# Patient Record
Sex: FEMALE | Race: WHITE | NOT HISPANIC OR LATINO | Employment: PART TIME | URBAN - METROPOLITAN AREA
[De-identification: names, ages, dates, MRNs, and addresses within clinical notes are randomized per-mention and may not be internally consistent; named-entity substitution may affect disease eponyms.]

---

## 2017-02-08 DIAGNOSIS — Z01.812 PRE-OPERATIVE LABORATORY EXAMINATION: ICD-10-CM

## 2017-02-08 LAB — HCG SERPL QL: NEGATIVE

## 2017-02-08 PROCEDURE — 36415 COLL VENOUS BLD VENIPUNCTURE: CPT

## 2017-02-08 PROCEDURE — 84703 CHORIONIC GONADOTROPIN ASSAY: CPT

## 2017-02-08 RX ORDER — LITHIUM CARBONATE 300 MG/1
450 TABLET, FILM COATED, EXTENDED RELEASE ORAL NIGHTLY
COMMUNITY
End: 2023-11-17

## 2017-02-08 RX ORDER — BUPROPION HYDROCHLORIDE 100 MG/1
100 TABLET ORAL DAILY
COMMUNITY
End: 2022-01-20

## 2017-02-08 RX ORDER — DROSPIRENONE AND ETHINYL ESTRADIOL 0.02-3(28)
1 KIT ORAL DAILY
COMMUNITY
End: 2022-01-20

## 2017-02-08 RX ORDER — ACETAMINOPHEN 500 MG
500-1000 TABLET ORAL EVERY 6 HOURS PRN
COMMUNITY
End: 2022-01-20

## 2017-02-08 NOTE — OR NURSING
Pre admit appt: Pt instructed to continue regularly prescribed medications through day before surgery.Per anesthesia protocol pt instructed to take these medications with a sip of water the day of surgery- lithium and tylenol if needed.

## 2017-02-09 ENCOUNTER — HOSPITAL ENCOUNTER (OUTPATIENT)
Facility: MEDICAL CENTER | Age: 32
End: 2017-02-09
Attending: SPECIALIST | Admitting: SPECIALIST
Payer: COMMERCIAL

## 2017-02-09 VITALS
HEART RATE: 58 BPM | RESPIRATION RATE: 16 BRPM | BODY MASS INDEX: 27.69 KG/M2 | HEIGHT: 71 IN | TEMPERATURE: 97.7 F | SYSTOLIC BLOOD PRESSURE: 134 MMHG | DIASTOLIC BLOOD PRESSURE: 82 MMHG | WEIGHT: 197.75 LBS | OXYGEN SATURATION: 95 %

## 2017-02-09 PROBLEM — Z41.1 ENCOUNTER FOR COSMETIC SURGERY: Status: ACTIVE | Noted: 2017-02-09

## 2017-02-09 PROCEDURE — 700111 HCHG RX REV CODE 636 W/ 250 OVERRIDE (IP)

## 2017-02-09 PROCEDURE — 700111 HCHG RX REV CODE 636 W/ 250 OVERRIDE (IP): Performed by: SPECIALIST

## 2017-02-09 PROCEDURE — 160002 HCHG RECOVERY MINUTES (STAT): Performed by: SPECIALIST

## 2017-02-09 PROCEDURE — 160009 HCHG ANES TIME/MIN: Performed by: SPECIALIST

## 2017-02-09 PROCEDURE — 501480 HCHG STOCKINETTE: Performed by: SPECIALIST

## 2017-02-09 PROCEDURE — 160025 RECOVERY II MINUTES (STATS): Performed by: SPECIALIST

## 2017-02-09 PROCEDURE — 160046 HCHG PACU - 1ST 60 MINS PHASE II: Performed by: SPECIALIST

## 2017-02-09 PROCEDURE — 160035 HCHG PACU - 1ST 60 MINS PHASE I: Performed by: SPECIALIST

## 2017-02-09 PROCEDURE — 500888 HCHG PACK, MINOR BASIN: Performed by: SPECIALIST

## 2017-02-09 PROCEDURE — 160036 HCHG PACU - EA ADDL 30 MINS PHASE I: Performed by: SPECIALIST

## 2017-02-09 PROCEDURE — A9270 NON-COVERED ITEM OR SERVICE: HCPCS

## 2017-02-09 PROCEDURE — A6402 STERILE GAUZE <= 16 SQ IN: HCPCS | Performed by: SPECIALIST

## 2017-02-09 PROCEDURE — A4606 OXYGEN PROBE USED W OXIMETER: HCPCS | Performed by: SPECIALIST

## 2017-02-09 PROCEDURE — 110382 HCHG SHELL REV 271: Performed by: SPECIALIST

## 2017-02-09 PROCEDURE — 160029 HCHG SURGERY MINUTES - 1ST 30 MINS LEVEL 4: Performed by: SPECIALIST

## 2017-02-09 PROCEDURE — 110371 HCHG SHELL REV 272: Performed by: SPECIALIST

## 2017-02-09 PROCEDURE — 501656 HCHG TUBING, ASPIRATION (LIPO): Performed by: SPECIALIST

## 2017-02-09 PROCEDURE — 700102 HCHG RX REV CODE 250 W/ 637 OVERRIDE(OP)

## 2017-02-09 PROCEDURE — 700101 HCHG RX REV CODE 250

## 2017-02-09 PROCEDURE — 160041 HCHG SURGERY MINUTES - EA ADDL 1 MIN LEVEL 4: Performed by: SPECIALIST

## 2017-02-09 PROCEDURE — 160048 HCHG OR STATISTICAL LEVEL 1-5: Performed by: SPECIALIST

## 2017-02-09 PROCEDURE — 501838 HCHG SUTURE GENERAL: Performed by: SPECIALIST

## 2017-02-09 RX ORDER — OXYCODONE HCL 5 MG/5 ML
SOLUTION, ORAL ORAL
Status: COMPLETED
Start: 2017-02-09 | End: 2017-02-09

## 2017-02-09 RX ORDER — LIDOCAINE HYDROCHLORIDE 5 MG/ML
INJECTION, SOLUTION INFILTRATION; INTRAVENOUS
Status: DISCONTINUED | OUTPATIENT
Start: 2017-02-09 | End: 2017-02-09 | Stop reason: HOSPADM

## 2017-02-09 RX ORDER — EPINEPHRINE 1 MG/ML(1)
AMPUL (ML) INJECTION
Status: DISCONTINUED | OUTPATIENT
Start: 2017-02-09 | End: 2017-02-09 | Stop reason: HOSPADM

## 2017-02-09 RX ORDER — SODIUM CHLORIDE, SODIUM LACTATE, POTASSIUM CHLORIDE, CALCIUM CHLORIDE 600; 310; 30; 20 MG/100ML; MG/100ML; MG/100ML; MG/100ML
INJECTION, SOLUTION INTRAVENOUS
Status: DISCONTINUED | OUTPATIENT
Start: 2017-02-09 | End: 2017-02-09 | Stop reason: HOSPADM

## 2017-02-09 RX ADMIN — HYDROMORPHONE HYDROCHLORIDE 0.2 MG: 1 INJECTION, SOLUTION INTRAMUSCULAR; INTRAVENOUS; SUBCUTANEOUS at 14:05

## 2017-02-09 RX ADMIN — FENTANYL CITRATE 25 MCG: 50 INJECTION, SOLUTION INTRAMUSCULAR; INTRAVENOUS at 14:15

## 2017-02-09 RX ADMIN — FENTANYL CITRATE 25 MCG: 50 INJECTION, SOLUTION INTRAMUSCULAR; INTRAVENOUS at 14:05

## 2017-02-09 RX ADMIN — FENTANYL CITRATE 25 MCG: 50 INJECTION, SOLUTION INTRAMUSCULAR; INTRAVENOUS at 13:45

## 2017-02-09 RX ADMIN — SODIUM CHLORIDE, POTASSIUM CHLORIDE, SODIUM LACTATE AND CALCIUM CHLORIDE 1000 ML: 600; 310; 30; 20 INJECTION, SOLUTION INTRAVENOUS at 08:37

## 2017-02-09 RX ADMIN — HYDROMORPHONE HYDROCHLORIDE 0.2 MG: 1 INJECTION, SOLUTION INTRAMUSCULAR; INTRAVENOUS; SUBCUTANEOUS at 14:30

## 2017-02-09 RX ADMIN — FENTANYL CITRATE 25 MCG: 50 INJECTION, SOLUTION INTRAMUSCULAR; INTRAVENOUS at 13:50

## 2017-02-09 RX ADMIN — OXYCODONE HYDROCHLORIDE 10 MG: 5 SOLUTION ORAL at 14:00

## 2017-02-09 RX ADMIN — HYDROMORPHONE HYDROCHLORIDE 0.2 MG: 1 INJECTION, SOLUTION INTRAMUSCULAR; INTRAVENOUS; SUBCUTANEOUS at 14:15

## 2017-02-09 ASSESSMENT — PAIN SCALES - GENERAL
PAINLEVEL_OUTOF10: 8
PAINLEVEL_OUTOF10: 8
PAINLEVEL_OUTOF10: 6
PAINLEVEL_OUTOF10: 0
PAINLEVEL_OUTOF10: 0
PAINLEVEL_OUTOF10: 7
PAINLEVEL_OUTOF10: 5
PAINLEVEL_OUTOF10: 8
PAINLEVEL_OUTOF10: 8
PAINLEVEL_OUTOF10: 5

## 2017-02-09 NOTE — IP AVS SNAPSHOT
" Home Care Instructions                                                                                                                Name:Ninfa Valladares  Medical Record Number:[id number]  CSN: [id number]    YOB: 1985   Age: 31 y.o.  Sex: female  HT:1.803 m (5' 11\") WT: 89.7 kg (197 lb 12 oz)          Admit Date: 2/9/2017     Discharge Date:   Today's Date: 2/9/2017  Attending Doctor:  Jose Ramon Camarillo M.D.                  Allergies:  Review of patient's allergies indicates no known allergies.                Discharge Instructions         ACTIVITY: Rest and take it easy for the first 24 hours.  A responsible adult is recommended to remain with you during that time.  It is normal to feel sleepy.  We encourage you to not do anything that requires balance, judgment or coordination.    MILD FLU-LIKE SYMPTOMS ARE NORMAL. YOU MAY EXPERIENCE GENERALIZED MUSCLE ACHES, THROAT IRRITATION, HEADACHE AND/OR SOME NAUSEA.    FOR 24 HOURS DO NOT:  Drive, operate machinery or run household appliances.  Drink beer or alcoholic beverages.   Make important decisions or sign legal documents.    SPECIAL INSTRUCTIONS:     DIET: To avoid nausea, slowly advance diet as tolerated, avoiding spicy or greasy foods for the first day.  Add more substantial food to your diet according to your physician's instructions.  Babies can be fed formula or breast milk as soon as they are hungry.  INCREASE FLUIDS AND FIBER TO AVOID CONSTIPATION.    SURGICAL DRESSING/BATHING:may shower in 48 hours    FOLLOW-UP APPOINTMENT:  A follow-up appointment should be arranged with your doctor ; call to schedule.    You should CALL YOUR PHYSICIAN if you develop:  Fever greater than 101 degrees F.  Pain not relieved by medication, or persistent nausea or vomiting.  Excessive bleeding (blood soaking through dressing) or unexpected drainage from the wound.  Extreme redness or swelling around the incision site, drainage of pus or foul smelling " drainage.  Inability to urinate or empty your bladder within 8 hours.  Problems with breathing or chest pain.    You should call 911 if you develop problems with breathing or chest pain.  If you are unable to contact your doctor or surgical center, you should go to the nearest emergency room or urgent care center.  Physician's telephone #: [phone]    If any questions arise, call your doctor.  If your doctor is not available, please feel free to call the Surgical Center at {Surgical Dept Numbers:[id number]}.  The Center is open Monday through Friday from 7AM to 7PM.  You can also call the HEALTH HOTLINE open 24 hours/day, 7 days/week and speak to a nurse at [phone], or toll free at [phone].    A registered nurse may call you a few days after your surgery to see how you are doing after your procedure.    MEDICATIONS: Resume taking daily medication.  Take prescribed pain medication with food.  If no medication is prescribed, you may take non-aspirin pain medication if needed.  PAIN MEDICATION CAN BE VERY CONSTIPATING.  Take a stool softener or laxative such as senokot, pericolace, or milk of magnesia if needed.    Prescription given prior o surgery.  Last pain medication given at 2 pm.    If your physician has prescribed pain medication that includes Acetaminophen (Tylenol), do not take additional Acetaminophen (Tylenol) while taking the prescribed medication.    Depression / Suicide Risk    As you are discharged from this [facility] facility, it is important to learn how to keep safe from harming yourself.    Recognize the warning signs:  · Abrupt changes in personality, positive or negative- including increase in energy   · Giving away possessions  · Change in eating patterns- significant weight changes-  positive or negative  · Change in sleeping patterns- unable to sleep or sleeping all the time   · Unwillingness or inability to communicate  · Depression  · Unusual sadness, discouragement and  loneliness  · Talk of wanting to die  · Neglect of personal appearance   · Rebelliousness- reckless behavior  · Withdrawal from people/activities they love  · Confusion- inability to concentrate     If you or a loved one observes any of these behaviors or has concerns about self-harm, here's what you can do:  · Talk about it- your feelings and reasons for harming yourself  · Remove any means that you might use to hurt yourself (examples: pills, rope, extension cords, firearm)  · Get professional help from the community (Mental Health, Substance Abuse, psychological counseling)  · Do not be alone:Call your Safe Contact- someone whom you trust who will be there for you.  · Call your local CRISIS HOTLINE 785-9020 or 365-974-0641  · Call your local Children's Mobile Crisis Response Team [place] (545) 267-8376 or www.[employer]  · Call the toll free National Suicide Prevention Hotlines   · National Suicide Prevention Lifeline 767-970-OYXV (3935)  · [place] Line Network 800-SUICIDE (687-5988)       Medication List      CONTINUE taking these medications        Instructions    acetaminophen 500 MG Tabs   Commonly known as:  TYLENOL    Take 500-1,000 mg by mouth every 6 hours as needed.   Dose:  500-1000 mg       buPROPion 100 MG Tabs   Commonly known as:  WELLBUTRIN    Take 100 mg by mouth every day.   Dose:  100 mg       busPIRone 15 MG tablet   Commonly known as:  BUSPAR    Take 15 mg by mouth 2 times a day.   Dose:  15 mg       lithium  MG Tbcr   Commonly known as:  LITHOBID    Take 300 mg by mouth 2 times a day.   Dose:  300 mg       LORYNA 3-0.02 MG per tablet   Generic drug:  drospirenone-ethinyl estradiol    Take 1 Tab by mouth every day.   Dose:  1 Tab               Medication Information     Above and/or attached are the medications your physician expects you to take upon discharge. Review all of your home medications and newly ordered medications with your doctor and/or pharmacist. Follow  medication instructions as directed by your doctor and/or pharmacist. Please keep your medication list with you and share with your physician. Update the information when medications are discontinued, doses are changed, or new medications (including over-the-counter products) are added; and carry medication information at all times in the event of emergency situations.        Resources     Quit Smoking / Tobacco Use:    I understand the use of any tobacco products increases my chance of suffering from future heart disease or stroke and could cause other illnesses which may shorten my life. Quitting the use of tobacco products is the single most important thing I can do to improve my health. For further information on smoking / tobacco cessation call a Toll Free Quit Line at 1-338.574.8271 (*National [employer] [place]) or 1-780.761.4497 (American Lung Association) or you can access the web based program at www.lungusa.org.    [place] Tobacco Users Help Line:  (313) 609-5845       Toll Free: 1-941.124.7976  Quit Tobacco Program [facility] [phone])[phone]    Crisis Hotline:    [place] Crisis Hotline:  4-100-DKHOYOT or 1-845.943.9421    [place] Crisis Hotline:    1-833.756.1583 or 023-680-8898    Discharge Survey:   Thank you for choosing [facility]. We hope we did everything we could to make your hospital stay a pleasant one. You may be receiving a survey and we would appreciate your time and participation in answering the questions. Your input is very valuable to us in our efforts to improve our service to our patients and their families.            Signatures     My signature on this form indicates that:    1. I acknowledge receipt and understanding of these Home Care Instruction.  2. My questions regarding this information have been answered to my satisfaction.  3. I have formulated a plan with my discharge nurse to obtain my prescribed medications for  home.    __________________________________      __________________________________                   Patient Signature                                 Guardian/Responsible Adult Signature      __________________________________                 __________       ________                       Nurse Signature                                               Date                 Time

## 2017-02-09 NOTE — IP AVS SNAPSHOT
2/9/2017          Ninfa Valladares  [address] [place] [address]    Dear Ninfa:    [facility] wants to ensure your discharge home is safe and you or your loved ones have had all your questions answered regarding your care after you leave the hospital.    You may receive a telephone call within two days of your discharge.  This call is to make certain you understand your discharge instructions as well as ensure we provided you with the best care possible during your stay with us.     The call will only last approximately 3-5 minutes and will be done by a nurse.    Once again, we want to ensure your discharge home is safe and that you have a clear understanding of any next steps in your care.  If you have any questions or concerns, please do not hesitate to contact us, we are here for you.  Thank you for choosing [facility] for your healthcare needs.    Sincerely,    Luis Angel Vidal    [facility]

## 2017-02-09 NOTE — OR NURSING
Patient allergies and NPO status verified, home medication reconciliation completed and belongings secured. Patient verbalizes understanding of pain scale, expected course of stay and plan of care. Surgical site verified with patient. Patient and family given home care instructions sheet for discharge planning. IV access established. Sequentials placed on legs.

## 2017-02-09 NOTE — OR NURSING
1332-arrives pacu. vss spont clear resp throughout O2 via FM at 10L.  dsg to abd and flank dry andintact. Compression garment in place.  Pt unresponsive at present.  1345-pt spontaneously arouses OA d/cd tolerated well O2 placed at @L NC. VSS  1400-pt c/o 8/10 pain medicated per Md orders.  Warmer in place.  dsg dry and intact. Vss.  1430-pt sleeping intermittently, c/o continued pain to flanks and thighs. mdicated per MD orders.  vss  dsg dry and intact.  1500-pt vss admits pain tolerable at present.  Temp wnls.  No drainage noted at present.  Meets criteria for stage 2. Report to Kimi MCKEON

## 2017-02-09 NOTE — DISCHARGE INSTRUCTIONS
ACTIVITY: Rest and take it easy for the first 24 hours.  A responsible adult is recommended to remain with you during that time.  It is normal to feel sleepy.  We encourage you to not do anything that requires balance, judgment or coordination.    MILD FLU-LIKE SYMPTOMS ARE NORMAL. YOU MAY EXPERIENCE GENERALIZED MUSCLE ACHES, THROAT IRRITATION, HEADACHE AND/OR SOME NAUSEA.    FOR 24 HOURS DO NOT:  Drive, operate machinery or run household appliances.  Drink beer or alcoholic beverages.   Make important decisions or sign legal documents.    SPECIAL INSTRUCTIONS:     DIET: To avoid nausea, slowly advance diet as tolerated, avoiding spicy or greasy foods for the first day.  Add more substantial food to your diet according to your physician's instructions.  Babies can be fed formula or breast milk as soon as they are hungry.  INCREASE FLUIDS AND FIBER TO AVOID CONSTIPATION.    SURGICAL DRESSING/BATHING:may shower in 48 hours    FOLLOW-UP APPOINTMENT:  A follow-up appointment should be arranged with your doctor ; call to schedule.    You should CALL YOUR PHYSICIAN if you develop:  Fever greater than 101 degrees F.  Pain not relieved by medication, or persistent nausea or vomiting.  Excessive bleeding (blood soaking through dressing) or unexpected drainage from the wound.  Extreme redness or swelling around the incision site, drainage of pus or foul smelling drainage.  Inability to urinate or empty your bladder within 8 hours.  Problems with breathing or chest pain.    You should call 911 if you develop problems with breathing or chest pain.  If you are unable to contact your doctor or surgical center, you should go to the nearest emergency room or urgent care center.  Physician's telephone #: [phone]    If any questions arise, call your doctor.  If your doctor is not available, please feel free to call the Surgical Center at {Surgical Dept Numbers:[id number]}.  The Center is open Monday through Friday from 7AM to 7PM.  You can  also call the HEALTH HOTLINE open 24 hours/day, 7 days/week and speak to a nurse at [phone], or toll free at [phone].    A registered nurse may call you a few days after your surgery to see how you are doing after your procedure.    MEDICATIONS: Resume taking daily medication.  Take prescribed pain medication with food.  If no medication is prescribed, you may take non-aspirin pain medication if needed.  PAIN MEDICATION CAN BE VERY CONSTIPATING.  Take a stool softener or laxative such as senokot, pericolace, or milk of magnesia if needed.    Prescription given prior o surgery.  Last pain medication given at 2 pm.    If your physician has prescribed pain medication that includes Acetaminophen (Tylenol), do not take additional Acetaminophen (Tylenol) while taking the prescribed medication.    Depression / Suicide Risk    As you are discharged from this [facility] facility, it is important to learn how to keep safe from harming yourself.    Recognize the warning signs:  · Abrupt changes in personality, positive or negative- including increase in energy   · Giving away possessions  · Change in eating patterns- significant weight changes-  positive or negative  · Change in sleeping patterns- unable to sleep or sleeping all the time   · Unwillingness or inability to communicate  · Depression  · Unusual sadness, discouragement and loneliness  · Talk of wanting to die  · Neglect of personal appearance   · Rebelliousness- reckless behavior  · Withdrawal from people/activities they love  · Confusion- inability to concentrate     If you or a loved one observes any of these behaviors or has concerns about self-harm, here's what you can do:  · Talk about it- your feelings and reasons for harming yourself  · Remove any means that you might use to hurt yourself (examples: pills, rope, extension cords, firearm)  · Get professional help from the community (Mental Health, Substance Abuse, psychological  counseling)  · Do not be alone:Call your Safe Contact- someone whom you trust who will be there for you.  · Call your local CRISIS HOTLINE 695-0346 or 006-327-8998  · Call your local Children's Mobile Crisis Response Team [place] (920) 727-6195 or www.[employer]  · Call the toll free National Suicide Prevention Hotlines   · National Suicide Prevention Lifeline 209-041-LYKM (4451)  · National Hope Line Network 800-SUICIDE (851-3777)

## 2017-02-09 NOTE — OR NURSING
1515 Patient to stage 2 recovery. Up and dressed. Vital signs taken.   1528 Patients mom at chairside.  1535 Patient and mom given discharge instructions. Verbalizes understanding. No further questions or concerns. IV discontinued. Patient discharged via wheelchair.

## 2017-02-09 NOTE — OR SURGEON
Immediate Post-Operative Note      PreOp Diagnosis: lipodystrophy    PostOp Diagnosis: same    Procedure(s):  LIPOSUCTION - ABDOMEN, FLANKS, INNER AND OUTER THIGHS - Wound Class: Clean    Surgeon(s):  Jose Ramon Camarillo M.D.    Anesthesiologist/Type of Anesthesia:  Anesthesiologist: Deny Rodrigues M.D./General    Surgical Staff:  Circulator: Shelly Harris R.N.  Scrub Person: Mamie Lancaster    Specimen:     Estimated Blood Loss: 100cc    Findings:     Complications: none        2/9/2017 1:42 PM Jose Ramon Camarillo

## 2017-02-10 NOTE — OP REPORT
DATE OF SERVICE:  02/09/2017    PREOPERATIVE DIAGNOSES:  Lipodystrophy of abdomen, flanks, inner and outer   thighs.    POSTOPERATIVE DIAGNOSIS:  Lipodystrophy of abdomen, flanks, inner and outer   thighs.    OPERATIVE PROCEDURE:  Power-assisted liposuction of abdomen, flanks, inner and   outer thighs, 5000 mL of super wet technique instilled, 6000 mL of aspirate   removed.    SURGEON:  Jose Ramon Camarillo MD    ANESTHESIOLOGIST:  Dr. Rodrigues.    ANESTHESIA:  General endotracheal tube.    COMPLICATIONS:  None.    ESTIMATED BLOOD LOSS:  100 mL    INDICATIONS:  The patient is a 31-year-old female who is here for liposuction   of the above named areas.  Risks and benefits of the procedure have been   explained in full including infection, irregularities, asymmetries, rippling   the skin, dimpling of skin.  The patient is aware of the risks and wished to   proceed.    FINDINGS:  As above.    DESCRIPTION OF PROCEDURE:  The patient was preoperatively marked in the   holding room in standing position.  She was taken to operating theater where   general anesthesia was induced.  Ancef 2 g were given prior to starting the   procedure.  Starting with the tumescent or the superwet technique infusion.    We used 1 bag of lactated ringers, 1 amp of epinephrine and 50 mL of 0.5%   lidocaine plain.  This was instilled into the pre-drawn areas through stab   incisions with a 15 blade.  A total of 5000 mL was instilled.  Once we had   fill all the tumescent fluid, I used a 4 mm Mercedes tip lipo cannula on a   power-assisted device.  I removed approximately 1600 mL from the abdomen,   approximately 1100 mL from the flanks.  A 550 mL from the outer thighs and 400   mL from the inner thighs.  A small amount was also taken from suprapubic   area.  Throughout the case, I used the pinch technique to ensure even removal   of the subcutaneous tissue, crosshatched when I could for even removal of   subcutaneous tissue.  The patient tolerated  procedure quite well.  Once we   were done with the liposuction, I closed the incisions with 5-0 Prolene   interrupted sutures.  Once we are completely done, the patient  was placed in   a compression garment.  TopiFoam was placed on the abdomen and flanks.  She   was returned to the PACU, extubated in stable condition.       ____________________________________     MD BRIAN DERAS / SHONDA    DD:  02/09/2017 17:50:40  DT:  02/09/2017 20:13:06    D#:  [id number]  Job#:  [id number]

## 2020-07-01 ENCOUNTER — OFFICE VISIT (OUTPATIENT)
Dept: URGENT CARE | Facility: PHYSICIAN GROUP | Age: 35
End: 2020-07-01
Payer: COMMERCIAL

## 2020-07-01 VITALS
BODY MASS INDEX: 26.68 KG/M2 | HEIGHT: 71 IN | WEIGHT: 190.6 LBS | TEMPERATURE: 97.8 F | HEART RATE: 74 BPM | SYSTOLIC BLOOD PRESSURE: 116 MMHG | OXYGEN SATURATION: 95 % | DIASTOLIC BLOOD PRESSURE: 58 MMHG

## 2020-07-01 DIAGNOSIS — J30.2 SEASONAL ALLERGIES: ICD-10-CM

## 2020-07-01 PROCEDURE — 99202 OFFICE O/P NEW SF 15 MIN: CPT | Performed by: PHYSICIAN ASSISTANT

## 2020-07-01 RX ORDER — LAMOTRIGINE 200 MG/1
200 TABLET ORAL
COMMUNITY
Start: 2020-06-11 | End: 2022-01-20

## 2020-07-01 RX ORDER — NALTREXONE HYDROCHLORIDE 50 MG/1
50 TABLET, FILM COATED ORAL
COMMUNITY
Start: 2020-06-25 | End: 2022-01-20

## 2020-07-01 RX ORDER — FLUOXETINE 10 MG/1
CAPSULE ORAL
COMMUNITY
Start: 2020-06-19 | End: 2022-01-20

## 2020-07-01 ASSESSMENT — ENCOUNTER SYMPTOMS
DOUBLE VISION: 0
DIZZINESS: 0
TREMORS: 0
NAUSEA: 0
TINGLING: 0
MYALGIAS: 0
COUGH: 0
FEVER: 0
SINUS PAIN: 0
CHILLS: 0
HEADACHES: 0
BLURRED VISION: 0
DIAPHORESIS: 0
ABDOMINAL PAIN: 0
VOMITING: 0
WEIGHT LOSS: 1
WHEEZING: 0
PALPITATIONS: 0
SHORTNESS OF BREATH: 0
SORE THROAT: 1

## 2020-07-01 NOTE — PROGRESS NOTES
Subjective:   Ninfa Simpson is a 34 y.o. female who presents for Pharyngitis (Sore throat over the last couple of months.  Most recently having bloody noses, ear pain and has lost 5 pounds over the last week.)    HPI:  This is a very pleasant 34-year-old female presents the clinic with sore throat which is been intermittent for the last 2 months.  Patient is concerned that she may have throat cancer.  Over the last month the patient describes her throat pain worse in the morning which gradually subsides throughout the day.  She also describes small amounts of blood after brushing her teeth.  Associated symptoms include runny nose sinus congestion and ear fullness.  Patient denies any fever, chills, cough, shortness of breath, difficulty swallowing or headaches.  She has not tried anything for symptoms as of yet.  Patient is a smoker and is currently trying to quit.    Review of Systems   Constitutional: Positive for weight loss (5 lb weigh loss in the last month.). Negative for chills, diaphoresis, fever and malaise/fatigue.   HENT: Positive for congestion and sore throat. Negative for ear discharge, hearing loss, sinus pain and tinnitus.    Eyes: Negative for blurred vision and double vision.   Respiratory: Negative for cough, shortness of breath and wheezing.    Cardiovascular: Negative for chest pain and palpitations.   Gastrointestinal: Negative for abdominal pain, nausea and vomiting.   Musculoskeletal: Negative for myalgias.   Skin: Negative for rash.   Neurological: Negative for dizziness, tingling, tremors and headaches.       Medications:    • acetaminophen Tabs  • buPROPion Tabs  • busPIRone  • drospirenone-ethinyl estradiol  • FLUoxetine Caps  • lamotrigine  • lithium CR Tbcr  • naltrexone Tabs    Allergies: Cephalexin and Clindamycin    Problem List: Ninfa Simpson has Encounter for cosmetic surgery on their problem list.    Surgical History:  Past Surgical History:   Procedure Laterality Date  "  • LIPOSUCTION Bilateral 2/9/2017    Procedure: LIPOSUCTION - ABDOMEN, FLANKS, INNER AND OUTER THIGHS;  Surgeon: Jose Ramon Camarillo M.D.;  Location: SURGERY [facility];  Service:    • TONSILLECTOMY  2016   • CYST EXCISION  2016    on leg   • LESION EXCISION GENERAL  2002    BIRTHMARK REMOVED   • MYRINGOTOMY Bilateral 1992       Past Social Hx: Ninfa Simpson  reports that she quit smoking about 4 years ago. Her smoking use included cigarettes. She quit after 10.00 years of use. She uses smokeless tobacco. She reports current alcohol use. She reports that she does not use drugs.     Past Family Hx:  Ninfa Simpson family history is not on file.     Problem list, medications, and allergies reviewed by myself today in Epic.     Objective:     /58   Pulse 74   Temp 36.6 °C (97.8 °F) (Temporal)   Ht 1.803 m (5' 11\")   Wt 86.5 kg (190 lb 9.6 oz)   LMP 06/10/2020 (Approximate)   SpO2 95%   BMI 26.58 kg/m²     Physical Exam  Constitutional:       General: She is not in acute distress.     Appearance: Normal appearance. She is not ill-appearing, toxic-appearing or diaphoretic.   HENT:      Head: Normocephalic and atraumatic.      Right Ear: Tympanic membrane, ear canal and external ear normal.      Left Ear: Tympanic membrane, ear canal and external ear normal.      Ears:      Comments: Small amounts of fluid behind the bilateral TMs.  Nonpurulent.  TMs nonerythematous and nonbulging.     Nose: Congestion present. No rhinorrhea.      Comments: Pale boggy pale mucosa.     Mouth/Throat:      Mouth: Mucous membranes are moist.      Pharynx: No oropharyngeal exudate or posterior oropharyngeal erythema.      Comments: Posterior erythemic's revealed postnasal drip.  Mildly erythematous no exudates.  Uvula midline and nondeviated.  Eyes:      Conjunctiva/sclera: Conjunctivae normal.   Neck:      Musculoskeletal: Normal range of motion and neck supple. No muscular tenderness.   Cardiovascular:      Rate and " Rhythm: Normal rate and regular rhythm.      Pulses: Normal pulses.      Heart sounds: Normal heart sounds.   Pulmonary:      Effort: Pulmonary effort is normal.      Breath sounds: Normal breath sounds. No wheezing.   Lymphadenopathy:      Cervical: No cervical adenopathy.   Skin:     General: Skin is warm and dry.      Capillary Refill: Capillary refill takes less than 2 seconds.   Neurological:      General: No focal deficit present.      Mental Status: She is alert and oriented to person, place, and time. Mental status is at baseline.   Psychiatric:         Mood and Affect: Mood normal.         Thought Content: Thought content normal.           Assessment/Plan:     Diagnosis and associated orders:     1. Seasonal allergies        Comments/MDM:       • Recommend starting OTC Flonase 2 sprays per nostril once per day.  • Start OTC Claritin/Zyrtec/Allegra 1/day.  • Follow-up with primary care tomorrow discuss possibility of starting Chantix.  • Recommended blood work including TSH to be performed by primary care.  • Red flag symptoms discussed with the patient at length today.  If any of the symptoms present return to clinic or nearest emergency department.           Differential diagnosis, natural history, supportive care, and indications for immediate follow-up discussed.    Advised the patient to follow-up with the primary care physician for recheck, reevaluation, and consideration of further management.    Please note that this dictation was created using voice recognition software. I have made reasonable attempt to correct obvious errors, but I expect that there are errors of grammar and possibly content that I did not discover before finalizing the note.    This note was electronically signed by ANGELA Rivera PA-C

## 2020-07-30 ENCOUNTER — APPOINTMENT (RX ONLY)
Dept: URBAN - METROPOLITAN AREA CLINIC 22 | Facility: CLINIC | Age: 35
Setting detail: DERMATOLOGY
End: 2020-07-30

## 2020-07-30 DIAGNOSIS — L81.4 OTHER MELANIN HYPERPIGMENTATION: ICD-10-CM

## 2020-07-30 DIAGNOSIS — D22 MELANOCYTIC NEVI: ICD-10-CM

## 2020-07-30 DIAGNOSIS — L91.0 HYPERTROPHIC SCAR: ICD-10-CM

## 2020-07-30 DIAGNOSIS — D18.0 HEMANGIOMA: ICD-10-CM

## 2020-07-30 DIAGNOSIS — Z71.89 OTHER SPECIFIED COUNSELING: ICD-10-CM

## 2020-07-30 PROBLEM — D22.5 MELANOCYTIC NEVI OF TRUNK: Status: ACTIVE | Noted: 2020-07-30

## 2020-07-30 PROBLEM — D18.01 HEMANGIOMA OF SKIN AND SUBCUTANEOUS TISSUE: Status: ACTIVE | Noted: 2020-07-30

## 2020-07-30 PROBLEM — D48.5 NEOPLASM OF UNCERTAIN BEHAVIOR OF SKIN: Status: ACTIVE | Noted: 2020-07-30

## 2020-07-30 PROCEDURE — ? COUNSELING

## 2020-07-30 PROCEDURE — 11102 TANGNTL BX SKIN SINGLE LES: CPT | Mod: 59

## 2020-07-30 PROCEDURE — ? PHOTO-DOCUMENTATION

## 2020-07-30 PROCEDURE — 99203 OFFICE O/P NEW LOW 30 MIN: CPT | Mod: 25

## 2020-07-30 PROCEDURE — 11103 TANGNTL BX SKIN EA SEP/ADDL: CPT | Mod: 59

## 2020-07-30 PROCEDURE — ? BIOPSY BY SHAVE METHOD

## 2020-07-30 PROCEDURE — 56605 BIOPSY OF VULVA/PERINEUM: CPT

## 2020-07-30 ASSESSMENT — LOCATION DETAILED DESCRIPTION DERM
LOCATION DETAILED: MONS PUBIS
LOCATION DETAILED: RIGHT DISTAL POSTERIOR THIGH
LOCATION DETAILED: LEFT INFERIOR UPPER BACK
LOCATION DETAILED: LEFT SUPERIOR UPPER BACK
LOCATION DETAILED: LEFT INFERIOR POSTERIOR HELIX
LOCATION DETAILED: LEFT PROXIMAL DORSAL FOREARM
LOCATION DETAILED: LEFT LATERAL ABDOMEN
LOCATION DETAILED: RIGHT MID-UPPER BACK

## 2020-07-30 ASSESSMENT — LOCATION SIMPLE DESCRIPTION DERM
LOCATION SIMPLE: RIGHT POSTERIOR THIGH
LOCATION SIMPLE: LEFT FOREARM
LOCATION SIMPLE: GROIN
LOCATION SIMPLE: ABDOMEN
LOCATION SIMPLE: RIGHT UPPER BACK
LOCATION SIMPLE: LEFT UPPER BACK
LOCATION SIMPLE: LEFT EAR

## 2020-07-30 ASSESSMENT — LOCATION ZONE DERM
LOCATION ZONE: TRUNK
LOCATION ZONE: VULVA
LOCATION ZONE: EAR
LOCATION ZONE: LEG
LOCATION ZONE: ARM

## 2021-07-16 ENCOUNTER — OFFICE VISIT (OUTPATIENT)
Dept: URGENT CARE | Facility: PHYSICIAN GROUP | Age: 36
End: 2021-07-16
Payer: COMMERCIAL

## 2021-07-16 VITALS
OXYGEN SATURATION: 99 % | BODY MASS INDEX: 28.7 KG/M2 | HEIGHT: 71 IN | RESPIRATION RATE: 16 BRPM | TEMPERATURE: 98.2 F | SYSTOLIC BLOOD PRESSURE: 136 MMHG | DIASTOLIC BLOOD PRESSURE: 76 MMHG | WEIGHT: 205 LBS | HEART RATE: 56 BPM

## 2021-07-16 DIAGNOSIS — R19.7 DIARRHEA OF PRESUMED INFECTIOUS ORIGIN: ICD-10-CM

## 2021-07-16 PROCEDURE — 99214 OFFICE O/P EST MOD 30 MIN: CPT | Performed by: NURSE PRACTITIONER

## 2021-07-16 RX ORDER — AZITHROMYCIN 500 MG/1
500 TABLET, FILM COATED ORAL DAILY
Qty: 5 TABLET | Refills: 0 | Status: SHIPPED | OUTPATIENT
Start: 2021-07-16 | End: 2021-07-21

## 2021-07-16 NOTE — PATIENT INSTRUCTIONS
Diarrhea, Adult  Diarrhea is frequent loose and watery bowel movements. Diarrhea can make you feel weak and cause you to become dehydrated. Dehydration can make you tired and thirsty, cause you to have a dry mouth, and decrease how often you urinate.  Diarrhea typically lasts 2-3 days. However, it can last longer if it is a sign of something more serious. It is important to treat your diarrhea as told by your health care provider.  Follow these instructions at home:  Eating and drinking         Follow these recommendations as told by your health care provider:  · Take an oral rehydration solution (ORS). This is an over-the-counter medicine that helps return your body to its normal balance of nutrients and water. It is found at pharmacies and retail stores.  · Drink plenty of fluids, such as water, ice chips, diluted fruit juice, and low-calorie sports drinks. You can drink milk also, if desired.  · Avoid drinking fluids that contain a lot of sugar or caffeine, such as energy drinks, sports drinks, and soda.  · Eat bland, easy-to-digest foods in small amounts as you are able. These foods include bananas, applesauce, rice, lean meats, toast, and crackers.  · Avoid alcohol.  · Avoid spicy or fatty foods.    Medicines  · Take over-the-counter and prescription medicines only as told by your health care provider.  · If you were prescribed an antibiotic medicine, take it as told by your health care provider. Do not stop using the antibiotic even if you start to feel better.  General instructions    · Wash your hands often using soap and water. If soap and water are not available, use a hand . Others in the household should wash their hands as well. Hands should be washed:  ? After using the toilet or changing a diaper.  ? Before preparing, cooking, or serving food.  ? While caring for a sick person or while visiting someone in a hospital.  · Drink enough fluid to keep your urine pale yellow.  · Rest at home while  you recover.  · Watch your condition for any changes.  · Take a warm bath to relieve any burning or pain from frequent diarrhea episodes.  · Keep all follow-up visits as told by your health care provider. This is important.  Contact a health care provider if:  · You have a fever.  · Your diarrhea gets worse.  · You have new symptoms.  · You cannot keep fluids down.  · You feel light-headed or dizzy.  · You have a headache.  · You have muscle cramps.  Get help right away if:  · You have chest pain.  · You feel extremely weak or you faint.  · You have bloody or black stools or stools that look like tar.  · You have severe pain, cramping, or bloating in your abdomen.  · You have trouble breathing or you are breathing very quickly.  · Your heart is beating very quickly.  · Your skin feels cold and clammy.  · You feel confused.  · You have signs of dehydration, such as:  ? Dark urine, very little urine, or no urine.  ? Cracked lips.  ? Dry mouth.  ? Sunken eyes.  ? Sleepiness.  ? Weakness.  Summary  · Diarrhea is frequent loose and watery bowel movements. Diarrhea can make you feel weak and cause you to become dehydrated.  · Drink enough fluids to keep your urine pale yellow.  · Make sure that you wash your hands after using the toilet. If soap and water are not available, use hand .  · Contact a health care provider if your diarrhea gets worse or you have new symptoms.  · Get help right away if you have signs of dehydration.  This information is not intended to replace advice given to you by your health care provider. Make sure you discuss any questions you have with your health care provider.  Document Released: 12/08/2003 Document Revised: 05/24/2019 Document Reviewed: 05/24/2019  [employer] Patient Education © 2020 [employer]

## 2021-07-16 NOTE — PROGRESS NOTES
Subjective:     Ninfa Simpson is a 35 y.o. female who presents for GI Problem (x3 weeks, pt states stomach pain, diarrhea, mucus)       GI Problem  This is a new problem. The problem has been gradually worsening. Associated symptoms include abdominal pain. Pertinent negatives include no chills, fever, nausea or vomiting.     Patient reports that 3 weeks ago, she started to develop diarrhea as well as lower abdominal cramping and pain.  Reports that her stools seemed to have mucus in it.  Was seen at [place] 1 week ago.  Started on a course of antibiotics for 5 days.  Patient does not recall which type.  However, reports that symptoms did improve.  However, started to recur once she finished the antibiotic.  Denies recent foreign travel.  Denies fever, shortness of breath, chills, or other symptoms.    Patient was screened prior to rooming and denied COVID-19 diagnosis or contact with a person who has been diagnosed or is suspected to have COVID-19. During this visit, appropriate PPE was worn, hand hygiene was performed, and the patient and any visitors were masked.     PMH:  has a past medical history of Psychiatric problem.    MEDS:   Current Outpatient Medications:   •  azithromycin (ZITHROMAX) 500 MG tablet, Take 1 tablet by mouth every day for 5 days., Disp: 5 tablet, Rfl: 0  •  naltrexone (DEPADE) 50 MG Tab, Take 50 mg by mouth every day., Disp: , Rfl:   •  lamotrigine (LAMICTAL) 200 MG tablet, Take 200 mg by mouth every day., Disp: , Rfl:   •  FLUoxetine (PROZAC) 10 MG Cap, TAKE 1 CAPSULE DAILY FOR 14 DAYS 10 DAYS PRIOR TO MENSTRUAL CYCLE., Disp: , Rfl:   •  buPROPion (WELLBUTRIN) 100 MG Tab, Take 100 mg by mouth every day., Disp: , Rfl:   •  lithium CR (LITHOBID) 300 MG Tab CR, Take 300 mg by mouth 2 times a day., Disp: , Rfl:   •  drospirenone-ethinyl estradiol (LORYNA) 3-0.02 MG per tablet, Take 1 Tab by mouth every day., Disp: , Rfl:   •  acetaminophen (TYLENOL) 500 MG Tab, Take 500-1,000 mg by  "mouth every 6 hours as needed., Disp: , Rfl:   •  busPIRone (BUSPAR) 15 MG tablet, Take 15 mg by mouth 2 times a day., Disp: , Rfl:     ALLERGIES:   Allergies   Allergen Reactions   • Cephalexin    • Clindamycin      SURGHX:   Past Surgical History:   Procedure Laterality Date   • LIPOSUCTION Bilateral 2/9/2017    Procedure: LIPOSUCTION - ABDOMEN, FLANKS, INNER AND OUTER THIGHS;  Surgeon: Jose Ramon Camarillo M.D.;  Location: SURGERY [facility];  Service:    • TONSILLECTOMY  2016   • CYST EXCISION  2016    on leg   • LESION EXCISION GENERAL  2002    BIRTHMARK REMOVED   • MYRINGOTOMY Bilateral 1992     SOCHX:  reports that she quit smoking about 5 years ago. Her smoking use included cigarettes. She quit after 10.00 years of use. She has quit using smokeless tobacco. She reports previous alcohol use. She reports that she does not use drugs.     FH: Reviewed with patient, not pertinent to this visit.    Review of Systems   Constitutional: Negative.  Negative for chills, fever and malaise/fatigue.   Respiratory: Negative.  Negative for shortness of breath.    Gastrointestinal: Positive for abdominal pain and diarrhea. Negative for blood in stool, nausea and vomiting.   All other systems reviewed and are negative.    Additional details per HPI.      Objective:     /76   Pulse (!) 56   Temp 36.8 °C (98.2 °F) (Temporal)   Resp 16   Ht 1.803 m (5' 11\")   Wt 93 kg (205 lb)   SpO2 99%   BMI 28.59 kg/m²     Physical Exam  Vitals reviewed.   Constitutional:       General: She is not in acute distress.     Appearance: She is well-developed. She is not ill-appearing or toxic-appearing.   HENT:      Head: Normocephalic.      Right Ear: External ear normal.      Left Ear: External ear normal.   Eyes:      General: Vision grossly intact.      Extraocular Movements: Extraocular movements intact.      Conjunctiva/sclera: Conjunctivae normal.   Cardiovascular:      Rate and Rhythm: Normal rate.   Pulmonary:      Effort: " Pulmonary effort is normal. No respiratory distress.   Abdominal:      General: Bowel sounds are increased. There is no distension.      Palpations: Abdomen is soft.      Tenderness: There is abdominal tenderness in the right lower quadrant and left lower quadrant. There is no guarding or rebound. Negative signs include McBurney's sign.   Musculoskeletal:         General: No deformity. Normal range of motion.      Cervical back: Normal range of motion.   Skin:     General: Skin is warm and dry.      Coloration: Skin is not pale.   Neurological:      Mental Status: She is alert and oriented to person, place, and time.      Sensory: No sensory deficit.      Motor: No weakness.      Coordination: Coordination normal.   Psychiatric:         Behavior: Behavior normal. Behavior is cooperative.       Assessment/Plan:     1. Diarrhea of presumed infectious origin  - CULTURE STOOL; Future  - SALMONELLA/SHIGELLA SCREEN  - CRYPTO/GIARDIA RAPID ASSAY; Future  - Complete O&P; Future  - C Diff Toxin; Future  - azithromycin (ZITHROMAX) 500 MG tablet; Take 1 tablet by mouth every day for 5 days.  Dispense: 5 tablet; Refill: 0    Rx as above sent electronically. Will obtain stool studies.    Differential diagnosis, natural history, supportive care, over-the-counter symptom management per 's instructions, close monitoring, and indications for immediate follow-up discussed.     Patient advised to: Return for 1) Symptoms that worsen/don't improve, or go to ER, 2) Follow up with primary care in 7-10 days.    All questions answered. Patient agrees with the plan of care.    Discharge summary provided.

## 2021-07-18 ENCOUNTER — TELEPHONE (OUTPATIENT)
Dept: URGENT CARE | Facility: PHYSICIAN GROUP | Age: 36
End: 2021-07-18

## 2021-07-20 ASSESSMENT — ENCOUNTER SYMPTOMS
BLOOD IN STOOL: 0
FEVER: 0
CHILLS: 0
RESPIRATORY NEGATIVE: 1
ABDOMINAL PAIN: 1
DIARRHEA: 1
NAUSEA: 0
CONSTITUTIONAL NEGATIVE: 1
VOMITING: 0
ROS GI COMMENTS: 1
SHORTNESS OF BREATH: 0

## 2021-07-20 ASSESSMENT — VISUAL ACUITY: OU: 1

## 2021-08-06 ENCOUNTER — APPOINTMENT (RX ONLY)
Dept: URBAN - METROPOLITAN AREA CLINIC 22 | Facility: CLINIC | Age: 36
Setting detail: DERMATOLOGY
End: 2021-08-06

## 2021-08-06 DIAGNOSIS — D18.0 HEMANGIOMA: ICD-10-CM

## 2021-08-06 DIAGNOSIS — L81.4 OTHER MELANIN HYPERPIGMENTATION: ICD-10-CM

## 2021-08-06 DIAGNOSIS — D22 MELANOCYTIC NEVI: ICD-10-CM

## 2021-08-06 DIAGNOSIS — Z87.2 PERSONAL HISTORY OF DISEASES OF THE SKIN AND SUBCUTANEOUS TISSUE: ICD-10-CM

## 2021-08-06 DIAGNOSIS — Z71.89 OTHER SPECIFIED COUNSELING: ICD-10-CM

## 2021-08-06 PROBLEM — D48.5 NEOPLASM OF UNCERTAIN BEHAVIOR OF SKIN: Status: ACTIVE | Noted: 2021-08-06

## 2021-08-06 PROBLEM — D22.5 MELANOCYTIC NEVI OF TRUNK: Status: ACTIVE | Noted: 2021-08-06

## 2021-08-06 PROBLEM — D18.01 HEMANGIOMA OF SKIN AND SUBCUTANEOUS TISSUE: Status: ACTIVE | Noted: 2021-08-06

## 2021-08-06 PROCEDURE — ? COUNSELING

## 2021-08-06 PROCEDURE — ? BIOPSY BY SHAVE METHOD

## 2021-08-06 PROCEDURE — 11102 TANGNTL BX SKIN SINGLE LES: CPT

## 2021-08-06 PROCEDURE — 11103 TANGNTL BX SKIN EA SEP/ADDL: CPT

## 2021-08-06 PROCEDURE — ? SUNSCREEN RECOMMENDATIONS

## 2021-08-06 PROCEDURE — 99213 OFFICE O/P EST LOW 20 MIN: CPT | Mod: 25

## 2021-08-06 ASSESSMENT — LOCATION SIMPLE DESCRIPTION DERM
LOCATION SIMPLE: LEFT UPPER BACK
LOCATION SIMPLE: RIGHT PRETIBIAL REGION
LOCATION SIMPLE: ABDOMEN
LOCATION SIMPLE: LEFT POSTERIOR AXILLA
LOCATION SIMPLE: LEFT FOREARM
LOCATION SIMPLE: RIGHT POSTERIOR THIGH

## 2021-08-06 ASSESSMENT — LOCATION DETAILED DESCRIPTION DERM
LOCATION DETAILED: RIGHT DISTAL POSTERIOR THIGH
LOCATION DETAILED: RIGHT PROXIMAL PRETIBIAL REGION
LOCATION DETAILED: LEFT INFERIOR UPPER BACK
LOCATION DETAILED: LEFT LATERAL ABDOMEN
LOCATION DETAILED: LEFT PROXIMAL DORSAL FOREARM
LOCATION DETAILED: LEFT POSTERIOR AXILLA

## 2021-08-06 ASSESSMENT — LOCATION ZONE DERM
LOCATION ZONE: TRUNK
LOCATION ZONE: LEG
LOCATION ZONE: ARM
LOCATION ZONE: AXILLAE

## 2021-08-10 ENCOUNTER — OFFICE VISIT (OUTPATIENT)
Dept: URGENT CARE | Facility: PHYSICIAN GROUP | Age: 36
End: 2021-08-10
Payer: COMMERCIAL

## 2021-08-10 VITALS
BODY MASS INDEX: 28.7 KG/M2 | HEART RATE: 60 BPM | OXYGEN SATURATION: 100 % | TEMPERATURE: 97.6 F | RESPIRATION RATE: 16 BRPM | SYSTOLIC BLOOD PRESSURE: 114 MMHG | HEIGHT: 71 IN | DIASTOLIC BLOOD PRESSURE: 54 MMHG | WEIGHT: 205 LBS

## 2021-08-10 DIAGNOSIS — B37.0 THRUSH: ICD-10-CM

## 2021-08-10 PROCEDURE — 99213 OFFICE O/P EST LOW 20 MIN: CPT | Performed by: NURSE PRACTITIONER

## 2021-08-10 ASSESSMENT — ENCOUNTER SYMPTOMS
CHILLS: 0
FEVER: 0
COUGH: 0
DIARRHEA: 1
MYALGIAS: 0
HEADACHES: 0
SORE THROAT: 0

## 2021-08-11 NOTE — PROGRESS NOTES
Subjective:     Ninfa Simpson is a 36 y.o. female who presents for Thrush (x2 weeks ago )      HPI  Pt presents for evaluation of a new problem.  Ninfa is a pleasant 36-year-old female presents to urgent care today with complaints of what she believes to be thrush.  For the past 3 months she has been suffering from chronic diarrhea and has been placed on 2 separate antibiotics to treat this.  She notes that after her second antibiotic she developed a white coating in her mouth.  She denies any pain or itching associated with this.  She has not used anything for the relief of her symptoms.     Review of Systems   Constitutional: Negative for chills and fever.   HENT: Negative for congestion and sore throat.    Respiratory: Negative for cough.    Gastrointestinal: Positive for diarrhea.   Musculoskeletal: Negative for myalgias.   Neurological: Negative for headaches.       PMH:   Past Medical History:   Diagnosis Date   • Psychiatric problem     depression,anxiety and borderline personality disorder     ALLERGIES:   Allergies   Allergen Reactions   • Cephalexin    • Clindamycin      SURGHX:   Past Surgical History:   Procedure Laterality Date   • LIPOSUCTION Bilateral 2017    Procedure: LIPOSUCTION - ABDOMEN, FLANKS, INNER AND OUTER THIGHS;  Surgeon: Jose Ramon Camarillo M.D.;  Location: SURGERY [facility];  Service:    • TONSILLECTOMY  2016   • CYST EXCISION  2016    on leg   • LESION EXCISION GENERAL      BIRTHMARK REMOVED   • MYRINGOTOMY Bilateral      SOCHX:   Social History     Socioeconomic History   • Marital status: Single     Spouse name: Not on file   • Number of children: Not on file   • Years of education: Not on file   • Highest education level: Not on file   Occupational History   • Not on file   Tobacco Use   • Smoking status: Former Smoker     Years: 10.00     Types: Cigarettes     Quit date: 2016     Years since quittin.5   • Smokeless tobacco: Former User   Vaping Use   •  "Vaping Use: Never used   Substance and Sexual Activity   • Alcohol use: Not Currently     Comment: quit 2 years ago   • Drug use: No   • Sexual activity: Not on file   Other Topics Concern   • Not on file   Social History Narrative   • Not on file     Social Determinants of Health     Financial Resource Strain:    • Difficulty of Paying Living Expenses:    Food Insecurity:    • Worried About Running Out of Food in the Last Year:    • Ran Out of Food in the Last Year:    Transportation Needs:    • Lack of Transportation (Medical):    • Lack of Transportation (Non-Medical):    Physical Activity:    • Days of Exercise per Week:    • Minutes of Exercise per Session:    Stress:    • Feeling of Stress :    Social Connections:    • Frequency of Communication with Friends and Family:    • Frequency of Social Gatherings with Friends and Family:    • Attends Anabaptism Services:    • Active Member of Clubs or Organizations:    • Attends Club or Organization Meetings:    • Marital Status:    Intimate Partner Violence:    • Fear of Current or Ex-Partner:    • Emotionally Abused:    • Physically Abused:    • Sexually Abused:      FH: History reviewed. No pertinent family history.      Objective:   /54   Pulse 60   Temp 36.4 °C (97.6 °F) (Temporal)   Resp 16   Ht 1.803 m (5' 11\")   Wt 93 kg (205 lb)   SpO2 100%   BMI 28.59 kg/m²     Physical Exam  Vitals and nursing note reviewed.   Constitutional:       General: She is not in acute distress.     Appearance: Normal appearance. She is not ill-appearing.   HENT:      Head: Normocephalic and atraumatic.      Right Ear: External ear normal.      Left Ear: External ear normal.      Nose: No congestion or rhinorrhea.      Mouth/Throat:      Mouth: Mucous membranes are moist.      Comments: Positive for white coating on tongue consistent with a possible thrush infection.  Eyes:      Extraocular Movements: Extraocular movements intact.      Pupils: Pupils are equal, round, " and reactive to light.   Cardiovascular:      Rate and Rhythm: Normal rate and regular rhythm.      Pulses: Normal pulses.      Heart sounds: Normal heart sounds.   Pulmonary:      Effort: Pulmonary effort is normal.      Breath sounds: Normal breath sounds.   Abdominal:      General: Abdomen is flat. Bowel sounds are normal.      Palpations: Abdomen is soft.   Musculoskeletal:         General: Normal range of motion.      Cervical back: Normal range of motion and neck supple.   Skin:     General: Skin is warm and dry.      Capillary Refill: Capillary refill takes less than 2 seconds.   Neurological:      General: No focal deficit present.      Mental Status: She is alert and oriented to person, place, and time. Mental status is at baseline.   Psychiatric:         Mood and Affect: Mood normal.         Behavior: Behavior normal.         Thought Content: Thought content normal.         Judgment: Judgment normal.         Assessment/Plan:   Assessment      1. Thrush  nystatin (MYCOSTATIN) 652184 UNIT/ML Suspension   She was encouraged to complete her stool testing and follow-up with GI consultants for further evaluation and treatment of her diarrhea.  Did prescribe nystatin suspension for relief of likely thrush infection.  Follow-up in clinic for worsening or persistent symptoms.  She verbalizes agreement and understanding of plan of care.

## 2021-08-12 ENCOUNTER — HOSPITAL ENCOUNTER (OUTPATIENT)
Facility: MEDICAL CENTER | Age: 36
End: 2021-08-12
Attending: NURSE PRACTITIONER
Payer: COMMERCIAL

## 2021-08-12 DIAGNOSIS — R19.7 DIARRHEA OF PRESUMED INFECTIOUS ORIGIN: ICD-10-CM

## 2021-08-12 LAB
C DIFF DNA SPEC QL NAA+PROBE: NEGATIVE
C DIFF TOX GENS STL QL NAA+PROBE: NEGATIVE
G LAMBLIA+C PARVUM AG STL QL RAPID: NORMAL
SIGNIFICANT IND 70042: NORMAL
SITE SITE: NORMAL
SOURCE SOURCE: NORMAL

## 2021-08-12 PROCEDURE — 87329 GIARDIA AG IA: CPT

## 2021-08-12 PROCEDURE — 87493 C DIFF AMPLIFIED PROBE: CPT

## 2021-08-12 PROCEDURE — 87045 FECES CULTURE AEROBIC BACT: CPT

## 2021-08-12 PROCEDURE — 87328 CRYPTOSPORIDIUM AG IA: CPT

## 2021-08-12 PROCEDURE — 87899 AGENT NOS ASSAY W/OPTIC: CPT

## 2021-08-14 LAB
BACTERIA STL CULT: NORMAL
C JEJUNI+C COLI AG STL QL: NORMAL
E COLI SXT1+2 STL IA: NORMAL
E COLI SXT1+2 STL IA: NORMAL
SIGNIFICANT IND 70042: NORMAL
SIGNIFICANT IND 70042: NORMAL
SITE SITE: NORMAL
SITE SITE: NORMAL
SOURCE SOURCE: NORMAL
SOURCE SOURCE: NORMAL

## 2022-01-20 ENCOUNTER — OFFICE VISIT (OUTPATIENT)
Dept: URGENT CARE | Facility: PHYSICIAN GROUP | Age: 37
End: 2022-01-20
Payer: COMMERCIAL

## 2022-01-20 VITALS
BODY MASS INDEX: 28.7 KG/M2 | OXYGEN SATURATION: 97 % | HEIGHT: 71 IN | DIASTOLIC BLOOD PRESSURE: 70 MMHG | SYSTOLIC BLOOD PRESSURE: 110 MMHG | TEMPERATURE: 98.6 F | RESPIRATION RATE: 16 BRPM | HEART RATE: 62 BPM | WEIGHT: 205 LBS

## 2022-01-20 DIAGNOSIS — J06.9 URI WITH COUGH AND CONGESTION: ICD-10-CM
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
## 2022-01-20 PROCEDURE — 99213 OFFICE O/P EST LOW 20 MIN: CPT | Performed by: PHYSICIAN ASSISTANT

## 2022-01-20 ASSESSMENT — ENCOUNTER SYMPTOMS
ABDOMINAL PAIN: 0
NAUSEA: 0
HEADACHES: 1
CHILLS: 1
SPUTUM PRODUCTION: 1
DIARRHEA: 0
MYALGIAS: 1
SORE THROAT: 1
SHORTNESS OF BREATH: 0
FEVER: 1
WHEEZING: 0
COUGH: 1
PALPITATIONS: 0
VOMITING: 0

## 2022-01-20 NOTE — PROGRESS NOTES
Subjective:   Ninfa Simpson is a 36 y.o. female who presents for Fever (body aches, sore throat, runny nose, feels joint are swollen, x1 week, got biten by a rat x2 weeks. )      HPI  36 y.o. female presents to urgent care with new problem to provider of low-grade fever, body aches, fatigue, sore throat, runny nose, postnasal drip, and minimal coughing for the last week.  Patient is vaccinated for COVID-19.  She reports possible sick contacts at work.  Patient has also taken 2 negative COVID tests at home.  She denies chest pain or shortness of breath.  Patient also expresses concerns secondary to a rat bite which she is sustained about 2 weeks ago from her pet rat at home.  She denies concern for rabies, however the rats have not had any vaccinations.  She denies any redness, pain, or swelling at healed site of initial bite wound.   Denies other associated aggravating or alleviating factors.     Review of Systems   Constitutional: Positive for chills, fever and malaise/fatigue.   HENT: Positive for congestion and sore throat. Negative for ear pain.         Runny nose   Respiratory: Positive for cough and sputum production. Negative for shortness of breath and wheezing.    Cardiovascular: Negative for chest pain and palpitations.   Gastrointestinal: Negative for abdominal pain, diarrhea, nausea and vomiting.   Musculoskeletal: Positive for myalgias.   Skin: Negative for rash.   Neurological: Positive for headaches.       Patient Active Problem List   Diagnosis   • Encounter for cosmetic surgery     Past Surgical History:   Procedure Laterality Date   • LIPOSUCTION Bilateral 2/9/2017    Procedure: LIPOSUCTION - ABDOMEN, FLANKS, INNER AND OUTER THIGHS;  Surgeon: Jose Ramon Camarillo M.D.;  Location: SURGERY [facility];  Service:    • TONSILLECTOMY  2016   • CYST EXCISION  2016    on leg   • LESION EXCISION GENERAL  2002    BIRTHMARK REMOVED   • MYRINGOTOMY Bilateral 1992     Social History     Tobacco Use   •  "Smoking status: Current Every Day Smoker     Years: 10.00     Types: Cigarettes     Last attempt to quit: 2016     Years since quittin.9   • Smokeless tobacco: Former User   Vaping Use   • Vaping Use: Never used   Substance Use Topics   • Alcohol use: Not Currently     Comment: quit 2 years ago   • Drug use: No      History reviewed. No pertinent family history.   (Allergies, Medications, & Tobacco/Substance Use were reconciled by the Medical Assistant and reviewed by myself. The family history is prepopulated)     Objective:     /70   Pulse 62   Temp 37 °C (98.6 °F) (Temporal)   Resp 16   Ht 1.803 m (5' 11\")   Wt 93 kg (205 lb)   SpO2 97%   BMI 28.59 kg/m²     Physical Exam  Vitals reviewed.   Constitutional:       General: She is not in acute distress.     Appearance: Normal appearance. She is not ill-appearing or diaphoretic.   HENT:      Head: Normocephalic and atraumatic.      Nose: Nose normal.      Mouth/Throat:      Mouth: Mucous membranes are moist.      Pharynx: No oropharyngeal exudate or posterior oropharyngeal erythema.   Eyes:      Conjunctiva/sclera: Conjunctivae normal.   Cardiovascular:      Rate and Rhythm: Normal rate and regular rhythm.      Heart sounds: Normal heart sounds. No murmur heard.  No friction rub. No gallop.    Pulmonary:      Effort: Pulmonary effort is normal. No respiratory distress.      Breath sounds: Normal breath sounds. No wheezing, rhonchi or rales.   Musculoskeletal:         General: Normal range of motion.      Cervical back: Normal range of motion and neck supple.   Lymphadenopathy:      Cervical: No cervical adenopathy.   Skin:     General: Skin is warm and dry.      Comments: No wound or erythema at site of rat bite on distal finger. No streaking.    Neurological:      General: No focal deficit present.      Mental Status: She is alert and oriented to person, place, and time.   Psychiatric:         Mood and Affect: Mood normal.         Behavior: " Behavior normal.         Thought Content: Thought content normal.         Judgment: Judgment normal.         Assessment/Plan:     1. URI with cough and congestion     2. Rat bite, initial encounter       I suspect viral etiology of patient's presenting symptoms.  She has had 2 negative at-home COVID test and I do not recommend further PCR testing as she has had symptoms for over 6 days and has already complied with CDC guidelines for self-isolation.  Recommend OTC medications for symptomatic relief including Tylenol/Motrin, and cold and cough medications.  There is no signs of infection at site of bite occurred 2 weeks ago.  We recommend patient continue to monitor sxs including fever and seek further further symptoms persist.  Differential diagnosis, natural history, supportive care, and indications for immediate follow-up discussed.    Advised the patient to follow-up with the primary care physician for recheck, reevaluation, and consideration of further management.  Patient verbalized understanding of treatment plan and has no further questions regarding care.     I personally reviewed prior external notes and test results pertinent to today's visit.   Please note that this dictation was created using voice recognition software. I have made a reasonable attempt to correct obvious errors, but I expect that there are errors of grammar and possibly content that I did not discover before finalizing the note.    This note was electronically signed by Marianna Toney PA-C

## 2022-02-03 ENCOUNTER — OFFICE VISIT (OUTPATIENT)
Dept: URGENT CARE | Facility: PHYSICIAN GROUP | Age: 37
End: 2022-02-03
Payer: COMMERCIAL

## 2022-02-03 VITALS
DIASTOLIC BLOOD PRESSURE: 70 MMHG | OXYGEN SATURATION: 98 % | BODY MASS INDEX: 28.7 KG/M2 | HEART RATE: 76 BPM | SYSTOLIC BLOOD PRESSURE: 108 MMHG | HEIGHT: 71 IN | TEMPERATURE: 97.7 F | RESPIRATION RATE: 14 BRPM | WEIGHT: 205 LBS

## 2022-02-03 DIAGNOSIS — H66.92 ACUTE LEFT OTITIS MEDIA: ICD-10-CM

## 2022-02-03 PROCEDURE — 99213 OFFICE O/P EST LOW 20 MIN: CPT | Performed by: NURSE PRACTITIONER

## 2022-02-03 RX ORDER — AMOXICILLIN 500 MG/1
500 CAPSULE ORAL 2 TIMES DAILY
Qty: 20 CAPSULE | Refills: 0 | Status: SHIPPED | OUTPATIENT
Start: 2022-02-03 | End: 2022-02-13

## 2022-02-03 RX ORDER — LURASIDONE HYDROCHLORIDE 20 MG/1
20 TABLET, FILM COATED ORAL
COMMUNITY

## 2022-02-04 NOTE — PROGRESS NOTES
Chief Complaint   Patient presents with   • Ear Pain     L ear , trouble hearing        HISTORY OF PRESENT ILLNESS: Patient is a pleasant 36 y.o. female who presents today due to four days of left ear pain and fullness sensation.  Notes that she recently recovered from a URI which included nasal congestion and a cough.  Notes that the symptoms have improved.  Denies any fever, chills, malaise.  She has tried OTC allergy medication for symptom relief.  Denies history of ear infections in her past.  No recent antibiotic usage.       Patient Active Problem List    Diagnosis Date Noted   • Encounter for cosmetic surgery 2017       Allergies:Cephalexin and Clindamycin    Current Outpatient Medications Ordered in Epic   Medication Sig Dispense Refill   • lurasidone (LATUDA) 20 MG Tab Take 20 mg by mouth with dinner.     • amoxicillin (AMOXIL) 500 MG Cap Take 1 Capsule by mouth 2 times a day for 10 days. 20 Capsule 0   • lithium CR (LITHOBID) 300 MG Tab CR Take 450 mg by mouth 2 times a day.     • busPIRone (BUSPAR) 15 MG tablet Take 15 mg by mouth 2 times a day.       No current [facility]-ordered facility-administered medications on file.       Past Medical History:   Diagnosis Date   • Psychiatric problem     depression,anxiety and borderline personality disorder       Social History     Tobacco Use   • Smoking status: Current Every Day Smoker     Years: 10.00     Types: Cigarettes     Last attempt to quit: 2016     Years since quittin.0   • Smokeless tobacco: Former User   Vaping Use   • Vaping Use: Never used   Substance Use Topics   • Alcohol use: Not Currently     Comment: quit 2 years ago   • Drug use: No       No family status information on file.   History reviewed. No pertinent family history.    ROS:  Review of Systems   Constitutional: Negative for fever, chills, fatigue. Negative for weight loss.   HENT: Positive for left ear pressure, ear fullness.  Negative for sinus pressure, nosebleeds, neck pain.  "   Eyes: Negative for vision changes.   Neuro: Negative for sensory changes, weakness, seizure, LOC.  Cardiovascular: Negative for chest pain, palpitations, orthopnea and leg swelling.   Respiratory: Negative for cough, sputum production, shortness of breath and wheezing.   Gastrointestinal: Negative for abdominal pain, nausea, vomiting or diarrhea.    Skin: Negative for rash, diaphoresis.     Exam:  /70 (BP Location: Left arm, Patient Position: Sitting, BP Cuff Size: Adult)   Pulse 76   Temp 36.5 °C (97.7 °F)   Resp 14   Ht 1.803 m (5' 11\")   Wt 93 kg (205 lb)   SpO2 98%   General: well-nourished, well-developed female in NAD  Head: normocephalic, atraumatic  Eyes: PERRLA, no conjunctival injection, acuity grossly intact, lids normal.  Ears: normal shape and symmetry, no tenderness, no discharge. External canals are without any significant edema or erythema.  Right tympanic membrane is without any inflammation, scant effusion.  Left tympanic membrane is mildly injected, intact, scant effusion.  Gross auditory acuity is intact.  Nose: symmetrical without tenderness, erythema and swelling noted bilateral turbinates, clear discharge.  No sinus tenderness.   Mouth/Throat: reasonable hygiene, no exudates or tonsillar enlargement. Erythema is present.   Neck: no masses, range of motion within normal limits, no tracheal deviation. No obvious thyroid enlargement.   Lymph: no cervical adenopathy. No supraclavicular adenopathy.   Neuro: alert and oriented. Cranial nerves 1-12 grossly intact. No sensory deficit.   Cardiovascular: regular rate and rhythm. No edema.  Pulmonary: no distress. Chest is symmetrical with respiration, no wheezes, crackles, or rhonchi.   Musculoskeletal: no clubbing, appropriate muscle tone, gait is stable.  Skin: warm, dry, intact, no clubbing, no cyanosis, no rashes.   Psych: appropriate mood, affect, judgement.         Assessment/Plan:  1. Acute left otitis media  amoxicillin (AMOXIL) " 500 MG Cap         Discussed symptoms most likely viral, self limiting illness. Flonase as directed.   Sleep with HOB elevated, humidifier at night, rest, increase fluid intake.   Contingent antibiotic prescription given to patient to fill upon meeting criteria of guidelines discussed.   Supportive care, differential diagnoses, and indications for immediate follow-up discussed with patient.   Pathogenesis of diagnosis discussed including typical length and natural progression.   Instructed to return to clinic or nearest emergency department for any change in condition, further concerns, or worsening of symptoms.  Patient states understanding of the plan of care and discharge instructions.  Instructed to make an appointment, for follow up, with her primary care provider.        Please note that this dictation was created using voice recognition software. I have made every reasonable attempt to correct obvious errors, but I expect that there are errors of grammar and possibly content that I did not discover before finalizing the note. N95 and safety glasses used for entire visit.       VIKY Ramirez.

## 2022-04-13 ENCOUNTER — HOSPITAL ENCOUNTER (OUTPATIENT)
Dept: RADIOLOGY | Facility: MEDICAL CENTER | Age: 37
End: 2022-04-13
Attending: STUDENT IN AN ORGANIZED HEALTH CARE EDUCATION/TRAINING PROGRAM
Payer: COMMERCIAL

## 2022-04-13 ENCOUNTER — OFFICE VISIT (OUTPATIENT)
Dept: URGENT CARE | Facility: PHYSICIAN GROUP | Age: 37
End: 2022-04-13
Payer: COMMERCIAL

## 2022-04-13 VITALS
BODY MASS INDEX: 30.15 KG/M2 | OXYGEN SATURATION: 99 % | HEIGHT: 71 IN | DIASTOLIC BLOOD PRESSURE: 76 MMHG | RESPIRATION RATE: 16 BRPM | TEMPERATURE: 98.2 F | HEART RATE: 66 BPM | SYSTOLIC BLOOD PRESSURE: 128 MMHG | WEIGHT: 215.4 LBS

## 2022-04-13 DIAGNOSIS — M25.532 LEFT WRIST PAIN: ICD-10-CM

## 2022-04-13 DIAGNOSIS — M89.8X1 PAIN OF LEFT SCAPULA: ICD-10-CM

## 2022-04-13 PROCEDURE — 73110 X-RAY EXAM OF WRIST: CPT | Mod: LT

## 2022-04-13 PROCEDURE — 99214 OFFICE O/P EST MOD 30 MIN: CPT | Performed by: STUDENT IN AN ORGANIZED HEALTH CARE EDUCATION/TRAINING PROGRAM

## 2022-04-13 RX ORDER — METHOCARBAMOL 500 MG/1
TABLET, FILM COATED ORAL
Qty: 40 TABLET | Refills: 0 | Status: SHIPPED | OUTPATIENT
Start: 2022-04-13 | End: 2023-11-17

## 2022-04-13 RX ORDER — MELOXICAM 15 MG/1
TABLET ORAL
Qty: 30 TABLET | Refills: 0 | Status: CANCELLED | OUTPATIENT
Start: 2022-04-13

## 2022-04-13 RX ORDER — LIDOCAINE 4 G/G
PATCH TOPICAL
Qty: 15 PATCH | Refills: 0 | Status: SHIPPED | OUTPATIENT
Start: 2022-04-13 | End: 2023-11-17

## 2022-04-13 NOTE — PROGRESS NOTES
"Subjective:   CHIEF COMPLAINT  Chief Complaint   Patient presents with   • Hand Injury     L hand/shoulder injury. Pt is unsure of how the injury happened, felt pain while getting dressed.       HPI  Ninfa Sipmson is a 36 y.o. female who presents with a chief complaint of left thumb pain.  Patient is right-hand dominant.  Says Monday night she was pulling up her pants and subsequently felt like her thumb was out of place.  Says she tried \"popping\" her thumb which worsened the symptoms.  Now the patient has had persistent discomfort most notable in the base of the thumb.  Symptoms are worse with any range of motion.  She has tried taking Tylenol and ibuprofen which have not helped.    Patient is a complains of left periscapular pain.  Symptoms been ongoing for approximate 1.5 months.  No trauma or injury.  Symptoms are constant and aggravated with general range of motion.  She tried taking Tylenol and ibuprofen has not helped.    REVIEW OF SYSTEMS  General: no fever or chills  GI: no nausea or vomiting  See HPI for further details.    PAST MEDICAL HISTORY   has a past medical history of Psychiatric problem.    SURGICAL HISTORY   has a past surgical history that includes myringotomy (Bilateral, 1992); lesion excision general (2002); tonsillectomy (2016); cyst excision (2016); and liposuction (Bilateral, 2/9/2017).    ALLERGIES  Allergies   Allergen Reactions   • Cephalexin    • Clindamycin        CURRENT MEDICATIONS  Home Medications     Reviewed by Eddie Cifuentes D.O. (Physician) on 04/13/22 at 0911  Med List Status: <None>   Medication Last Dose Status   busPIRone (BUSPAR) 15 MG tablet Taking Active   lithium CR (LITHOBID) 300 MG Tab CR Taking Active   lurasidone (LATUDA) 20 MG Tab Taking Active                SOCIAL HISTORY  Social History     Tobacco Use   • Smoking status: Current Every Day Smoker     Years: 10.00     Types: Cigarettes   • Smokeless tobacco: Former User   Vaping Use   • Vaping Use: Never " "used   Substance and Sexual Activity   • Alcohol use: Not Currently     Comment: quit 2 years ago   • Drug use: No   • Sexual activity: Not on file       FAMILY HISTORY  No family history on file.       Objective:   PHYSICAL EXAM  VITAL SIGNS: /76 (BP Location: Left arm, Patient Position: Sitting, BP Cuff Size: Adult)   Pulse 66   Temp 36.8 °C (98.2 °F)   Resp 16   Ht 1.803 m (5' 11\")   Wt 97.7 kg (215 lb 6.4 oz)   SpO2 99%   BMI 30.04 kg/m²     Gen: no acute distress, normal voice  Psych: normal affect, normal judgement, alert, awake  Skin: dry, intact, moist mucosal membranes  Lungs: CTAB w/ symmetric expansion  CV: RRR w/o murmurs or clicks  MSK: Left thumb: No erythema, ecchymosis or edema.  Full range of motion associated with mild discernible discomfort with opposition and flexion.  TTP along the base of the thumb and snuffbox.  No laxity of the thumb with valgus stress.  Distal sensation and motor full intact.    Left shoulder/scapula.  Full range of motion.  Mild TTP along the medial margins of the scapula.      RADIOLOGY RESULTS   DX-WRIST-COMPLETE 3+ LEFT    Result Date: 4/13/2022 4/13/2022 9:25 AM HISTORY/REASON FOR EXAM:  Pain and deformity and first digit of left hand after injury left wrist TECHNIQUE/EXAM DESCRIPTION AND NUMBER OF VIEWS: 4 views of the LEFT wrist. COMPARISON:  No comparison available FINDINGS:  Bone mineralization is normal.  There is no evidence of fracture or dislocation.  Soft tissues are normal.     No evidence of fracture or dislocation.             Assessment/Plan:     1. Left wrist pain  DX-WRIST-COMPLETE 3+ LEFT   2. Pain of left scapula  methocarbamol (ROBAXIN) 500 MG Tab    Lidocaine 4 % Patch   1) multiple views of the left wrist were negative for any acute osseous abnormalities.  Suspect she suffered a sprain of her thumb.  -Ibuprofen 800 mg 3 times daily as needed  -Provided handout with home stretches and exercises  -Follow-up with PCP; consider possible " referral to physical therapy    2) suspect secondary to underlying muscle spasms.  -Ibuprofen 800 mg tid as needed  -Ordered Rx for Robaxin  -Ordered Rx for topical lidocaine patch  -Follow-up with PCP; consider possible referral to physical therapy    Differential diagnosis, natural history, supportive care, and indications for immediate follow-up discussed. All questions answered. Patient agrees with the plan of care.    Follow-up as needed if symptoms worsen or fail to improve to PCP, Urgent care or Emergency Room.    Please note that this dictation was created using voice recognition software. I have made a reasonable attempt to correct obvious errors, but I expect that there are errors of grammar and possibly content that I did not discover before finalizing the note.           31 minutes was spent caring for this patient on the day of the encounter which included face-to-face time, personally reviewing x-rays with the patient, discussing the diagnosis, medical management, need for follow-up, return precautions and completion of the chart. This does not include time spent on separately billable procedures/tests.

## 2022-05-05 ENCOUNTER — HOSPITAL ENCOUNTER (OUTPATIENT)
Dept: RADIOLOGY | Facility: MEDICAL CENTER | Age: 37
End: 2022-05-05
Attending: FAMILY MEDICINE
Payer: COMMERCIAL

## 2022-05-05 DIAGNOSIS — Z01.811 PRE-OPERATIVE RESPIRATORY EXAMINATION: ICD-10-CM

## 2022-05-05 PROCEDURE — 71046 X-RAY EXAM CHEST 2 VIEWS: CPT

## 2022-05-18 ENCOUNTER — PRE-ADMISSION TESTING (OUTPATIENT)
Dept: ADMISSIONS | Facility: MEDICAL CENTER | Age: 37
End: 2022-05-18
Attending: PODIATRIST
Payer: COMMERCIAL

## 2022-05-18 RX ORDER — GABAPENTIN 100 MG/1
100 CAPSULE ORAL 3 TIMES DAILY PRN
COMMUNITY

## 2022-05-19 ENCOUNTER — APPOINTMENT (OUTPATIENT)
Dept: RADIOLOGY | Facility: MEDICAL CENTER | Age: 37
End: 2022-05-19
Attending: PODIATRIST
Payer: COMMERCIAL

## 2022-05-19 ENCOUNTER — ANESTHESIA (OUTPATIENT)
Dept: SURGERY | Facility: MEDICAL CENTER | Age: 37
End: 2022-05-19
Payer: COMMERCIAL

## 2022-05-19 ENCOUNTER — HOSPITAL ENCOUNTER (OUTPATIENT)
Facility: MEDICAL CENTER | Age: 37
End: 2022-05-19
Attending: PODIATRIST | Admitting: PODIATRIST
Payer: COMMERCIAL

## 2022-05-19 ENCOUNTER — ANESTHESIA EVENT (OUTPATIENT)
Dept: SURGERY | Facility: MEDICAL CENTER | Age: 37
End: 2022-05-19
Payer: COMMERCIAL

## 2022-05-19 VITALS
BODY MASS INDEX: 30.24 KG/M2 | TEMPERATURE: 96.8 F | RESPIRATION RATE: 18 BRPM | HEIGHT: 70 IN | OXYGEN SATURATION: 94 % | DIASTOLIC BLOOD PRESSURE: 76 MMHG | WEIGHT: 211.2 LBS | SYSTOLIC BLOOD PRESSURE: 123 MMHG | HEART RATE: 71 BPM

## 2022-05-19 LAB — HCG SERPL QL: NEGATIVE

## 2022-05-19 PROCEDURE — 01480 ANES OPEN PX LOWER L/A/F NOS: CPT | Performed by: ANESTHESIOLOGY

## 2022-05-19 PROCEDURE — 700101 HCHG RX REV CODE 250

## 2022-05-19 PROCEDURE — 160027 HCHG SURGERY MINUTES - 1ST 30 MINS LEVEL 2: Performed by: PODIATRIST

## 2022-05-19 PROCEDURE — 501838 HCHG SUTURE GENERAL: Performed by: PODIATRIST

## 2022-05-19 PROCEDURE — 160048 HCHG OR STATISTICAL LEVEL 1-5: Performed by: PODIATRIST

## 2022-05-19 PROCEDURE — 160046 HCHG PACU - 1ST 60 MINS PHASE II: Performed by: PODIATRIST

## 2022-05-19 PROCEDURE — A9270 NON-COVERED ITEM OR SERVICE: HCPCS | Performed by: ANESTHESIOLOGY

## 2022-05-19 PROCEDURE — 73630 X-RAY EXAM OF FOOT: CPT | Mod: RT

## 2022-05-19 PROCEDURE — 700105 HCHG RX REV CODE 258: Performed by: ANESTHESIOLOGY

## 2022-05-19 PROCEDURE — 700111 HCHG RX REV CODE 636 W/ 250 OVERRIDE (IP): Performed by: ANESTHESIOLOGY

## 2022-05-19 PROCEDURE — 500864 HCHG NEEDLE, SPINAL 18G: Performed by: PODIATRIST

## 2022-05-19 PROCEDURE — 160009 HCHG ANES TIME/MIN: Performed by: PODIATRIST

## 2022-05-19 PROCEDURE — 700102 HCHG RX REV CODE 250 W/ 637 OVERRIDE(OP): Performed by: ANESTHESIOLOGY

## 2022-05-19 PROCEDURE — A6222 GAUZE <=16 IN NO W/SAL W/O B: HCPCS | Performed by: PODIATRIST

## 2022-05-19 PROCEDURE — 160038 HCHG SURGERY MINUTES - EA ADDL 1 MIN LEVEL 2: Performed by: PODIATRIST

## 2022-05-19 PROCEDURE — 160025 RECOVERY II MINUTES (STATS): Performed by: PODIATRIST

## 2022-05-19 PROCEDURE — 160002 HCHG RECOVERY MINUTES (STAT): Performed by: PODIATRIST

## 2022-05-19 PROCEDURE — 84703 CHORIONIC GONADOTROPIN ASSAY: CPT

## 2022-05-19 PROCEDURE — 700111 HCHG RX REV CODE 636 W/ 250 OVERRIDE (IP): Performed by: PODIATRIST

## 2022-05-19 PROCEDURE — 160035 HCHG PACU - 1ST 60 MINS PHASE I: Performed by: PODIATRIST

## 2022-05-19 RX ORDER — BUPIVACAINE HYDROCHLORIDE 5 MG/ML
INJECTION, SOLUTION PERINEURAL
Status: DISCONTINUED | OUTPATIENT
Start: 2022-05-19 | End: 2022-05-19 | Stop reason: HOSPADM

## 2022-05-19 RX ORDER — DEXAMETHASONE SODIUM PHOSPHATE 4 MG/ML
INJECTION, SOLUTION INTRA-ARTICULAR; INTRALESIONAL; INTRAMUSCULAR; INTRAVENOUS; SOFT TISSUE
Status: DISCONTINUED
Start: 2022-05-19 | End: 2022-05-19 | Stop reason: HOSPADM

## 2022-05-19 RX ORDER — CELECOXIB 200 MG/1
400 CAPSULE ORAL ONCE
Status: COMPLETED | OUTPATIENT
Start: 2022-05-19 | End: 2022-05-19

## 2022-05-19 RX ORDER — LIDOCAINE HYDROCHLORIDE 10 MG/ML
INJECTION, SOLUTION INFILTRATION; PERINEURAL
Status: DISCONTINUED
Start: 2022-05-19 | End: 2022-05-19 | Stop reason: HOSPADM

## 2022-05-19 RX ORDER — DEXAMETHASONE SODIUM PHOSPHATE 4 MG/ML
INJECTION, SOLUTION INTRA-ARTICULAR; INTRALESIONAL; INTRAMUSCULAR; INTRAVENOUS; SOFT TISSUE
Status: DISCONTINUED | OUTPATIENT
Start: 2022-05-19 | End: 2022-05-19 | Stop reason: HOSPADM

## 2022-05-19 RX ORDER — LIDOCAINE HYDROCHLORIDE 10 MG/ML
INJECTION, SOLUTION INFILTRATION; PERINEURAL
Status: DISCONTINUED | OUTPATIENT
Start: 2022-05-19 | End: 2022-05-19 | Stop reason: HOSPADM

## 2022-05-19 RX ORDER — SCOLOPAMINE TRANSDERMAL SYSTEM 1 MG/1
1 PATCH, EXTENDED RELEASE TRANSDERMAL
Status: DISCONTINUED | OUTPATIENT
Start: 2022-05-19 | End: 2022-05-19 | Stop reason: HOSPADM

## 2022-05-19 RX ORDER — HALOPERIDOL 5 MG/ML
1 INJECTION INTRAMUSCULAR
Status: DISCONTINUED | OUTPATIENT
Start: 2022-05-19 | End: 2022-05-19 | Stop reason: HOSPADM

## 2022-05-19 RX ORDER — ALBUTEROL SULFATE 2.5 MG/3ML
2.5 SOLUTION RESPIRATORY (INHALATION)
Status: DISCONTINUED | OUTPATIENT
Start: 2022-05-19 | End: 2022-05-19 | Stop reason: HOSPADM

## 2022-05-19 RX ORDER — MIDAZOLAM HYDROCHLORIDE 1 MG/ML
INJECTION INTRAMUSCULAR; INTRAVENOUS PRN
Status: DISCONTINUED | OUTPATIENT
Start: 2022-05-19 | End: 2022-05-19 | Stop reason: SURG

## 2022-05-19 RX ORDER — BUPIVACAINE HYDROCHLORIDE 5 MG/ML
INJECTION, SOLUTION EPIDURAL; INTRACAUDAL
Status: DISCONTINUED
Start: 2022-05-19 | End: 2022-05-19 | Stop reason: HOSPADM

## 2022-05-19 RX ORDER — SODIUM CHLORIDE, SODIUM LACTATE, POTASSIUM CHLORIDE, CALCIUM CHLORIDE 600; 310; 30; 20 MG/100ML; MG/100ML; MG/100ML; MG/100ML
INJECTION, SOLUTION INTRAVENOUS CONTINUOUS
Status: DISCONTINUED | OUTPATIENT
Start: 2022-05-19 | End: 2022-05-19 | Stop reason: HOSPADM

## 2022-05-19 RX ORDER — DEXAMETHASONE SODIUM PHOSPHATE 4 MG/ML
INJECTION, SOLUTION INTRA-ARTICULAR; INTRALESIONAL; INTRAMUSCULAR; INTRAVENOUS; SOFT TISSUE PRN
Status: DISCONTINUED | OUTPATIENT
Start: 2022-05-19 | End: 2022-05-19 | Stop reason: SURG

## 2022-05-19 RX ORDER — GABAPENTIN 300 MG/1
300 CAPSULE ORAL ONCE
Status: COMPLETED | OUTPATIENT
Start: 2022-05-19 | End: 2022-05-19

## 2022-05-19 RX ORDER — LORAZEPAM 2 MG/ML
0.5 INJECTION INTRAMUSCULAR
Status: DISCONTINUED | OUTPATIENT
Start: 2022-05-19 | End: 2022-05-19 | Stop reason: HOSPADM

## 2022-05-19 RX ORDER — HYDRALAZINE HYDROCHLORIDE 20 MG/ML
5 INJECTION INTRAMUSCULAR; INTRAVENOUS
Status: DISCONTINUED | OUTPATIENT
Start: 2022-05-19 | End: 2022-05-19 | Stop reason: HOSPADM

## 2022-05-19 RX ORDER — OXYCODONE HCL 5 MG/5 ML
10 SOLUTION, ORAL ORAL
Status: COMPLETED | OUTPATIENT
Start: 2022-05-19 | End: 2022-05-19

## 2022-05-19 RX ORDER — DIPHENHYDRAMINE HYDROCHLORIDE 50 MG/ML
12.5 INJECTION INTRAMUSCULAR; INTRAVENOUS
Status: DISCONTINUED | OUTPATIENT
Start: 2022-05-19 | End: 2022-05-19 | Stop reason: HOSPADM

## 2022-05-19 RX ORDER — SODIUM CHLORIDE, SODIUM LACTATE, POTASSIUM CHLORIDE, CALCIUM CHLORIDE 600; 310; 30; 20 MG/100ML; MG/100ML; MG/100ML; MG/100ML
INJECTION, SOLUTION INTRAVENOUS
Status: DISCONTINUED | OUTPATIENT
Start: 2022-05-19 | End: 2022-05-19 | Stop reason: SURG

## 2022-05-19 RX ORDER — OXYCODONE HCL 5 MG/5 ML
5 SOLUTION, ORAL ORAL
Status: COMPLETED | OUTPATIENT
Start: 2022-05-19 | End: 2022-05-19

## 2022-05-19 RX ORDER — ONDANSETRON 2 MG/ML
INJECTION INTRAMUSCULAR; INTRAVENOUS PRN
Status: DISCONTINUED | OUTPATIENT
Start: 2022-05-19 | End: 2022-05-19 | Stop reason: SURG

## 2022-05-19 RX ORDER — ACETAMINOPHEN 500 MG
1000 TABLET ORAL ONCE
Status: COMPLETED | OUTPATIENT
Start: 2022-05-19 | End: 2022-05-19

## 2022-05-19 RX ORDER — CEFAZOLIN SODIUM 1 G/3ML
INJECTION, POWDER, FOR SOLUTION INTRAMUSCULAR; INTRAVENOUS PRN
Status: DISCONTINUED | OUTPATIENT
Start: 2022-05-19 | End: 2022-05-19 | Stop reason: SURG

## 2022-05-19 RX ADMIN — CELECOXIB 400 MG: 200 CAPSULE ORAL at 11:59

## 2022-05-19 RX ADMIN — SODIUM CHLORIDE, POTASSIUM CHLORIDE, SODIUM LACTATE AND CALCIUM CHLORIDE: 600; 310; 30; 20 INJECTION, SOLUTION INTRAVENOUS at 13:14

## 2022-05-19 RX ADMIN — PROPOFOL 50 MG: 10 INJECTION, EMULSION INTRAVENOUS at 13:54

## 2022-05-19 RX ADMIN — MIDAZOLAM HYDROCHLORIDE 2 MG: 1 INJECTION, SOLUTION INTRAMUSCULAR; INTRAVENOUS at 13:15

## 2022-05-19 RX ADMIN — ACETAMINOPHEN 1000 MG: 500 TABLET ORAL at 11:59

## 2022-05-19 RX ADMIN — DEXAMETHASONE SODIUM PHOSPHATE 10 MG: 4 INJECTION, SOLUTION INTRA-ARTICULAR; INTRALESIONAL; INTRAMUSCULAR; INTRAVENOUS; SOFT TISSUE at 13:21

## 2022-05-19 RX ADMIN — PROPOFOL 150 MG: 10 INJECTION, EMULSION INTRAVENOUS at 13:18

## 2022-05-19 RX ADMIN — OXYCODONE HYDROCHLORIDE 10 MG: 5 SOLUTION ORAL at 14:34

## 2022-05-19 RX ADMIN — SCOPALAMINE 1 PATCH: 1 PATCH, EXTENDED RELEASE TRANSDERMAL at 11:59

## 2022-05-19 RX ADMIN — HALOPERIDOL LACTATE 1 MG: 5 INJECTION, SOLUTION INTRAMUSCULAR at 14:23

## 2022-05-19 RX ADMIN — GABAPENTIN 300 MG: 300 CAPSULE ORAL at 11:59

## 2022-05-19 RX ADMIN — CEFAZOLIN 2 G: 330 INJECTION, POWDER, FOR SOLUTION INTRAMUSCULAR; INTRAVENOUS at 13:21

## 2022-05-19 RX ADMIN — ONDANSETRON 8 MG: 2 INJECTION INTRAMUSCULAR; INTRAVENOUS at 13:54

## 2022-05-19 ASSESSMENT — PAIN DESCRIPTION - PAIN TYPE
TYPE: SURGICAL PAIN

## 2022-05-19 ASSESSMENT — PAIN SCALES - GENERAL: PAIN_LEVEL: 6

## 2022-05-19 NOTE — DISCHARGE INSTRUCTIONS
ACTIVITY: Rest and take it easy for the first 24 hours.  A responsible adult is recommended to remain with you during that time.  It is normal to feel sleepy.  We encourage you to not do anything that requires balance, judgment or coordination.    MILD FLU-LIKE SYMPTOMS ARE NORMAL. YOU MAY EXPERIENCE GENERALIZED MUSCLE ACHES, THROAT IRRITATION, HEADACHE AND/OR SOME NAUSEA.    FOR 24 HOURS DO NOT:  Drive, operate machinery or run household appliances.  Drink beer or alcoholic beverages.   Make important decisions or sign legal documents.    SPECIAL INSTRUCTIONS:  Please discharge with post-op shoe, wb 10m per hour, call with any needs. LD    DIET: To avoid nausea, slowly advance diet as tolerated, avoiding spicy or greasy foods for the first day.  Add more substantial food to your diet according to your physician's instructions.  Babies can be fed formula or breast milk as soon as they are hungry.  INCREASE FLUIDS AND FIBER TO AVOID CONSTIPATION.    SURGICAL DRESSING/BATHING: Keep dressing clean dry and intact until follow up with Dr. Sunshine.     FOLLOW-UP APPOINTMENT:  A follow-up appointment should be arranged with your doctor in 1 week; call to schedule.    You should CALL YOUR PHYSICIAN if you develop:  Fever greater than 101 degrees F.  Pain not relieved by medication, or persistent nausea or vomiting.  Excessive bleeding (blood soaking through dressing) or unexpected drainage from the wound.  Extreme redness or swelling around the incision site, drainage of pus or foul smelling drainage.  Inability to urinate or empty your bladder within 8 hours.  Problems with breathing or chest pain.    You should call 911 if you develop problems with breathing or chest pain.  If you are unable to contact your doctor or surgical center, you should go to the nearest emergency room or urgent care center.  Physician's telephone #: [phone]    If any questions arise, call your doctor.  If your doctor is not available,  please feel free to call the Surgical Center at [phone].     A registered nurse may call you a few days after your surgery to see how you are doing after your procedure.    MEDICATIONS: Resume taking daily medication.  Take prescribed pain medication with food.  If no medication is prescribed, you may take non-aspirin pain medication if needed.  PAIN MEDICATION CAN BE VERY CONSTIPATING.  Take a stool softener or laxative such as senokot, pericolace, or milk of magnesia if needed.    Last pain medication given at **.    If your physician has prescribed pain medication that includes Acetaminophen (Tylenol), do not take additional Acetaminophen (Tylenol) while taking the prescribed medication.    Depression / Suicide Risk    As you are discharged from this [facility] facility, it is important to learn how to keep safe from harming yourself.    Recognize the warning signs:  Abrupt changes in personality, positive or negative- including increase in energy   Giving away possessions  Change in eating patterns- significant weight changes-  positive or negative  Change in sleeping patterns- unable to sleep or sleeping all the time   Unwillingness or inability to communicate  Depression  Unusual sadness, discouragement and loneliness  Talk of wanting to die  Neglect of personal appearance   Rebelliousness- reckless behavior  Withdrawal from people/activities they love  Confusion- inability to concentrate     If you or a loved one observes any of these behaviors or has concerns about self-harm, here's what you can do:  Talk about it- your feelings and reasons for harming yourself  Remove any means that you might use to hurt yourself (examples: pills, rope, extension cords, firearm)  Get professional help from the community (Mental Health, Substance Abuse, psychological counseling)  Do not be alone:Call your Safe Contact- someone whom you trust who will be there for you.  Call your local CRISIS HOTLINE 836-3914 or  492-780-9093  Call your local Children's Mobile Crisis Response Team [place] (990) 094-6999 or [email or web address]  Call the toll free National Suicide Prevention Hotlines   National Suicide Prevention Lifeline 857-541-RNBC (1422)  National NetWitness Line Network 800-SUICIDE (790-7229)

## 2022-05-19 NOTE — ANESTHESIA TIME REPORT
Anesthesia Start and Stop Event Times     Date Time Event    5/19/2022 1236 Ready for Procedure     1314 Anesthesia Start     1406 Anesthesia Stop        Responsible Staff  05/19/22    Name Role Begin End    Nevaeh Godfrey M.D. Anesth 1314 1403        Overtime Reason:  no overtime (within assigned shift)    Comments:

## 2022-05-19 NOTE — ANESTHESIA POSTPROCEDURE EVALUATION
Patient: Ninfa Simpson    Procedure Summary     Date: 05/19/22 Room / Location: [facility] ROOM 23 / SURGERY SAME DAY [facility]    Anesthesia Start: 1314 Anesthesia Stop: 1406    Procedures:       FASCIOTOMY - FOR RELEASE OF PLANTAR FASCIA (Right Heel)      EXCISION, EXOSTOSIS - BONE SPUR REMOVAL (Right Foot) Diagnosis: (FASCITIS AND HEEL SPUR LEFT FOOT)    Surgeons: Queenie Sunshine D.P.M. Responsible Provider: Nevaeh Godfrey M.D.    Anesthesia Type: general ASA Status: 2          Final Anesthesia Type: general  Last vitals  BP   Blood Pressure: 125/81    Temp   36 °C (96.8 °F)    Pulse   65   Resp   18    SpO2   93 %      Anesthesia Post Evaluation    Patient location during evaluation: PACU  Patient participation: complete - patient participated  Level of consciousness: awake and alert  Pain score: 6    Airway patency: patent  Anesthetic complications: no  Cardiovascular status: hemodynamically stable  Respiratory status: acceptable  Hydration status: euvolemic    PONV: none          No complications documented.

## 2022-05-19 NOTE — OP REPORT
Operative report    Preoperative diagnosis: chronic plantar fasciitis right foot   #2 heel spur right foot calcaneus    Postoperative diagnosis: same    Report of operation; the patient is taken to the operating room and placed in the operating room table in the supine position.  Anesthesia is induced at this time.  After the patient is sedated a one-to-one mixture of 0.5% Marcaine plain with 1% lidocaine with epinephrine is utilized to anesthetize the patient's right heel in the form of a field block.  The foot is scrubbed prepped and draped in usual aseptic fashion, exsanguinated with an Esmarch bandage, and the pneumatic ankle tourniquet is inflated to 250 mmHg about a well-padded right ankle.    Next a attention is directed to the medial aspect of the right heel.  A 4 mm incision is made on the medial aspect of the right heel and deepened down carefully taking care to avoid neurovascular structures and cauterize any superficial bleeders.  The medial and central portion of the plantar fascial is completely released at this time with the assistance of tenotomy scissors.  Careful attention is directed to the area to avoid neurovascular damage.  The tenotomy scissors were withdrawn and the medial half of the plantar fascial is completely transected.  5-0 Vicryl is utilized to close this incision.    Attention is then directed to the plantar aspect of the right heel with the assistance of fluoroscopy, a small stab incision is made over the infracalcaneal spur and deepened down to the level of the spur.  A hand rasp was utilized to remove the point of the sharp heel spur.  The area is copiously irrigated and tissues are then closed with a 5-0 nylon.  The surgical sites were infiltrated with dexamethasone phosphate.  Adaptic and a dry sterile compressive dressing was then applied the pneumatic ankle tourniquet is deflated and there is a rapid hyperemic response to all 5 digits right foot.  Patient was then transported  to PACU and there recovered uneventfully.  Patient will follow up in the clinic within the week.  Weightbearing 10 minutes/h, keep the foot clean and dry.  All questions were answered.

## 2022-05-19 NOTE — OP REPORT
PreOp Diagnosis: chronic plantar fascitis and bone spur right foot      PostOp Diagnosis: same      Procedure(s):  FASCIOTOMY - FOR RELEASE OF PLANTAR FASCIA - Wound Class: Clean Contaminated  EXCISION, EXOSTOSIS - BONE SPUR REMOVAL - Wound Class: Clean Contaminated    Surgeon(s):  Queenie Sunshine D.P.M.    Anesthesiologist/Type of Anesthesia:  Anesthesiologist: Nevaeh Godfrey M.D./General    Surgical Staff:  Circulator: Frances Carter R.N.  Scrub Person: Jovany Toney R.N.    Specimens removed if any:  * No specimens in log *    Estimated Blood Loss: min    Findings: c/w diagnosis    Complications: none

## 2022-05-19 NOTE — ANESTHESIA PREPROCEDURE EVALUATION
Case: [id number] Date/Time: 05/19/22 1245    Procedures:       FASCIOTOMY - FOR RELEASE OF PLANTAR FASCIA (Right )      EXCISION, EXOSTOSIS - BONE SPUR REMOVAL    Pre-op diagnosis: FASCITIS AND HEEL SPUR LEFT FOOT    Location: [facility] ROOM 23 / SURGERY SAME DAY [facility]    Surgeons: Queenie Sunshine D.P.M.        35yo F here for release of plantar fascia    Relevant Problems   Other   (positive) Psychiatric problem (depression, anxiety, and bipolar)   (positive) Tobacco abuse (10 pack years; quit smoking May 2022, just a week ago)       Physical Exam    Airway   Mallampati: I  TM distance: >3 FB  Neck ROM: full       Cardiovascular - normal exam  Rhythm: regular  Rate: normal  (-) murmur     Dental - normal exam           Pulmonary - normal exam  Breath sounds clear to auscultation     Abdominal    Neurological - normal exam               Anesthesia Plan    ASA 2       Plan - general       Airway plan will be LMA          Induction: intravenous    Postoperative Plan: Postoperative administration of opioids is intended.    Pertinent diagnostic labs and testing reviewed    Informed Consent:    Anesthetic plan and risks discussed with patient.    Use of blood products discussed with: patient whom consented to blood products.

## 2022-05-19 NOTE — OR SURGEON
Immediate Post OP Note    PreOp Diagnosis: chronic fascitis right foot , heel spur       PostOp Diagnosis: same       Procedure(s):  FASCIOTOMY - FOR RELEASE OF PLANTAR FASCIA - Wound Class: Clean Contaminated  EXCISION, EXOSTOSIS - BONE SPUR REMOVAL - Wound Class: Clean Contaminated    Surgeon(s):  Queenie Sunshine D.P.M.    Anesthesiologist/Type of Anesthesia:  Anesthesiologist: Nevaeh Godfrey M.D./General    Surgical Staff:  Circulator: Frances Carter R.N.  Scrub Person: Jovany Toney R.N.    Specimens removed if any:  * No specimens in log *    Estimated Blood Loss: min     Findings: consistent with dx    Complications: none        5/19/2022 2:02 PM Queenie Sunshine D.P.M.

## 2022-05-19 NOTE — OR NURSING
1402 Pt to PACU from OR. Report from anesthesia and OR RN. On 6L O2 via mask. Respirations even and unlabored. VSS.     1412 Patient tolerating oral intake. States pain is tolerable, but is experiencing nausea. Medicated per MAR.    1432 Patient's mother updated via phone call. Patient meets phase 2 criteria.    1434 Patient medicated per MAR for pain.    1505 Patient up and dressed.    1512 PIV removed with tip intact.    1515 Discharge instructions discussed with mother, Genoveva. All questions answered. Verbalized understanding. Pt escorted out of department in wheelchair with all belongings. Discharged home to responsible adult.

## 2022-05-19 NOTE — ANESTHESIA PROCEDURE NOTES
Airway    Date/Time: 5/19/2022 1:18 PM  Performed by: Nevaeh Godfrey M.D.  Authorized by: Nevaeh Godfrey M.D.     Location:  OR  Urgency:  Elective  Indications for Airway Management:  Anesthesia      Spontaneous Ventilation: absent    Sedation Level:  Deep  Preoxygenated: Yes    Final Airway Type:  Supraglottic airway  Final Supraglottic Airway:  Standard LMA    SGA Size:  4  Number of Attempts at Approach:  1

## 2022-08-12 ENCOUNTER — APPOINTMENT (RX ONLY)
Dept: URBAN - METROPOLITAN AREA CLINIC 22 | Facility: CLINIC | Age: 37
Setting detail: DERMATOLOGY
End: 2022-08-12

## 2022-08-12 DIAGNOSIS — D22 MELANOCYTIC NEVI: ICD-10-CM

## 2022-08-12 DIAGNOSIS — Z87.2 PERSONAL HISTORY OF DISEASES OF THE SKIN AND SUBCUTANEOUS TISSUE: ICD-10-CM

## 2022-08-12 DIAGNOSIS — Z71.89 OTHER SPECIFIED COUNSELING: ICD-10-CM

## 2022-08-12 DIAGNOSIS — L91.0 HYPERTROPHIC SCAR: ICD-10-CM

## 2022-08-12 DIAGNOSIS — L82.1 OTHER SEBORRHEIC KERATOSIS: ICD-10-CM

## 2022-08-12 DIAGNOSIS — L81.4 OTHER MELANIN HYPERPIGMENTATION: ICD-10-CM

## 2022-08-12 DIAGNOSIS — D18.0 HEMANGIOMA: ICD-10-CM

## 2022-08-12 PROBLEM — D22.5 MELANOCYTIC NEVI OF TRUNK: Status: ACTIVE | Noted: 2022-08-12

## 2022-08-12 PROBLEM — D18.01 HEMANGIOMA OF SKIN AND SUBCUTANEOUS TISSUE: Status: ACTIVE | Noted: 2022-08-12

## 2022-08-12 PROCEDURE — 99213 OFFICE O/P EST LOW 20 MIN: CPT | Mod: 25

## 2022-08-12 PROCEDURE — ? ADDITIONAL NOTES

## 2022-08-12 PROCEDURE — 11900 INJECT SKIN LESIONS </W 7: CPT

## 2022-08-12 PROCEDURE — ? DIAGNOSIS COMMENT

## 2022-08-12 PROCEDURE — ? SUNSCREEN RECOMMENDATIONS

## 2022-08-12 PROCEDURE — ? INTRALESIONAL KENALOG

## 2022-08-12 PROCEDURE — ? COUNSELING

## 2022-08-12 ASSESSMENT — LOCATION ZONE DERM
LOCATION ZONE: TRUNK
LOCATION ZONE: EAR
LOCATION ZONE: ARM
LOCATION ZONE: LEG

## 2022-08-12 ASSESSMENT — LOCATION DETAILED DESCRIPTION DERM
LOCATION DETAILED: LEFT INFERIOR UPPER BACK
LOCATION DETAILED: LEFT POSTERIOR EARLOBE
LOCATION DETAILED: LEFT PROXIMAL DORSAL FOREARM
LOCATION DETAILED: LEFT LATERAL ABDOMEN
LOCATION DETAILED: RIGHT PROXIMAL PRETIBIAL REGION
LOCATION DETAILED: RIGHT MID-UPPER BACK

## 2022-08-12 ASSESSMENT — LOCATION SIMPLE DESCRIPTION DERM
LOCATION SIMPLE: LEFT UPPER BACK
LOCATION SIMPLE: LEFT FOREARM
LOCATION SIMPLE: ABDOMEN
LOCATION SIMPLE: RIGHT PRETIBIAL REGION
LOCATION SIMPLE: RIGHT UPPER BACK
LOCATION SIMPLE: LEFT EAR

## 2022-08-12 NOTE — PROCEDURE: ADDITIONAL NOTES
Additional Notes: We discussed further shave removal if this scar does not soften with injection today
Render Risk Assessment In Note?: no
Detail Level: Simple

## 2022-08-12 NOTE — PROCEDURE: INTRALESIONAL KENALOG
How Many Mls Were Removed From The 40 Mg/Ml (1ml) Vial When Preparing The Injectable Solution?: 0
Administered By (Optional): STEVEN Gonsalez
Ndc# For Kenalog Only: 9125-2333-04
Medical Necessity Clause: This procedure was medically necessary because the lesions that were treated were:
How Many Mls Were Removed From The 40 Mg/Ml (10ml) Vial When Preparing The Injectable Solution?: 0.2
Treatment Number (Optional): 1
Detail Level: Detailed
Lot # For Kenalog (Optional): [id number]
Kenalog Preparation: Kenalog
Concentration Of Kenalog Solution Injected (Mg/Ml): 40.0
Include Z78.9 (Other Specified Conditions Influencing Health Status) As An Associated Diagnosis?: No
Validate Note Data When Using Inventory: Yes
Consent: The risks of atrophy were reviewed with the patient.
Expiration Date For Kenalog (Optional): 02/2023
Which Kenalog Vial Was Used?: Kenalog 40 mg/ml (10 ml vial)

## 2023-03-19 ENCOUNTER — OFFICE VISIT (OUTPATIENT)
Dept: URGENT CARE | Facility: PHYSICIAN GROUP | Age: 38
End: 2023-03-19
Payer: COMMERCIAL

## 2023-03-19 VITALS
OXYGEN SATURATION: 97 % | HEIGHT: 71 IN | TEMPERATURE: 97.7 F | BODY MASS INDEX: 29.4 KG/M2 | HEART RATE: 73 BPM | SYSTOLIC BLOOD PRESSURE: 132 MMHG | DIASTOLIC BLOOD PRESSURE: 90 MMHG | WEIGHT: 210 LBS | RESPIRATION RATE: 14 BRPM

## 2023-03-19 DIAGNOSIS — J01.00 ACUTE NON-RECURRENT MAXILLARY SINUSITIS: ICD-10-CM

## 2023-03-19 PROCEDURE — 99213 OFFICE O/P EST LOW 20 MIN: CPT | Performed by: PHYSICIAN ASSISTANT

## 2023-03-19 RX ORDER — LISDEXAMFETAMINE DIMESYLATE 40 MG/1
40 CAPSULE ORAL DAILY
COMMUNITY
Start: 2023-03-10

## 2023-03-19 RX ORDER — ELAGOLIX 150 MG/1
1 TABLET, FILM COATED ORAL
COMMUNITY
Start: 2023-03-04 | End: 2023-11-17

## 2023-03-19 RX ORDER — AMOXICILLIN AND CLAVULANATE POTASSIUM 875; 125 MG/1; MG/1
1 TABLET, FILM COATED ORAL 2 TIMES DAILY
Qty: 20 TABLET | Refills: 0 | Status: SHIPPED | OUTPATIENT
Start: 2023-03-19 | End: 2023-03-29

## 2023-03-19 ASSESSMENT — ENCOUNTER SYMPTOMS
CHILLS: 1
HEADACHES: 1
EYES NEGATIVE: 1
COUGH: 0
SORE THROAT: 0
GASTROINTESTINAL NEGATIVE: 1
FEVER: 1
CARDIOVASCULAR NEGATIVE: 1
PSYCHIATRIC NEGATIVE: 1
MYALGIAS: 1

## 2023-03-20 NOTE — PROGRESS NOTES
"Subjective     Ninfa Simpson is a 37 y.o. female who presents with Sinus Problem (Got sick 2 weeks ago and lingered, is getting worse. Fatigued, can't hear out of L ear except thumping, pain. Issues with vertigo. )            Ninfa states she has had 2 to 3-week history of URI that has now worsened into a left sinus pressure.  She is having pain to her left ear as well as pressure with looking downward.  She describes purulent green drainage from her nose.  She denies cough.  She has subjective fever and chills.  She has history of sinus infections, but usually 1 a year and is able to manage with OTC medications and fluids.  She is an every day smoker.    Sinus Problem  Associated symptoms include chills, congestion, ear pain and headaches. Pertinent negatives include no coughing or sore throat.     Review of Systems   Constitutional:  Positive for chills, fever and malaise/fatigue.   HENT:  Positive for congestion and ear pain. Negative for sore throat.    Eyes: Negative.    Respiratory:  Negative for cough.    Cardiovascular: Negative.    Gastrointestinal: Negative.    Genitourinary: Negative.    Musculoskeletal:  Positive for myalgias. Negative for joint pain.   Skin: Negative.    Neurological:  Positive for headaches.   Endo/Heme/Allergies: Negative.    Psychiatric/Behavioral: Negative.              Objective     BP (!) 132/90   Pulse 73   Temp 36.5 °C (97.7 °F)   Resp 14   Ht 1.791 m (5' 10.5\")   Wt 95.3 kg (210 lb)   SpO2 97%   BMI 29.71 kg/m²      Physical Exam  Cardiovascular:      Rate and Rhythm: Normal rate.           Physical Exam   Nursing note and vitals reviewed.    Constitutional:   Appropriately groomed, pleasant affect, well nourished, in NAD.    Head:   Normocephalic, atraumatic.    Eyes:   PERRLA, EOM's full, sclera white, conjunctiva not erythematous, and medial canthus without exudate bilaterally.    Ears:  Auricle and tragus non-tender to manipulation.  No pre-auricular " lymphadenopathy or mastoid ttp.  EACs with mild cerumen bilaterally, not erythematous.  TM’s pearly gray with cone of light present and umbo and malleolus visible bilaterally.  No bulging or fluid bubbles present in middle ear.  Hearing grossly intact to voice.    Nose:  Nares not patent bilaterally.  Nasal mucosa edematous with yellow-white rhinorrhea bilaterally. Maxillary sinuses exquisitely tender to percussion L>R.    Throat:  Dentition wnl, mucosa moist without lesions.  Oropharynx erythematous, with no enlargement of the palatine tonsils bilaterally with no exudates.    Post nasal drainage present.  Soft palate rises symmetrically bilaterally and uvula midline.      Neck: Neck supple, with mild anterior lymphadenopathy that is soft and mobile to palpation. Thyroid non-palpable without tenderness or nodules. No supraclavicular lymphadenopathy.    Lungs:  Respiratory effort not labored without accessory muscle use.  Lungs clear to auscultation bilaterally without wheezes or rales. Rhonchi clear to cough.    Heart:  RRR, without murmurs rubs or gallops.  Radial and dorsalis pedis pulse 2+ bilaterally.  No LE edema.    Musculoskeletal:  Gait non-antalgic with a narrow base.    Derm:  Skin without rashes or lesions with good turgor pressure.      Psychiatric:  Mood, affect, and judgement appropriate.                 Assessment & Plan        1. Acute non-recurrent maxillary sinusitis  amoxicillin-clavulanate (AUGMENTIN) 875-125 MG Tab        Prescribed Augmentin.  Patient with left maxillary sinusitis.  Advised her to use NeilMed sinus rinse and increase fluids.  Ibuprofen and Tylenol for pain.

## 2023-06-14 ENCOUNTER — OFFICE VISIT (OUTPATIENT)
Dept: URGENT CARE | Facility: PHYSICIAN GROUP | Age: 38
End: 2023-06-14
Payer: COMMERCIAL

## 2023-06-14 VITALS
DIASTOLIC BLOOD PRESSURE: 78 MMHG | BODY MASS INDEX: 28.63 KG/M2 | WEIGHT: 200 LBS | TEMPERATURE: 98 F | HEIGHT: 70 IN | SYSTOLIC BLOOD PRESSURE: 128 MMHG | RESPIRATION RATE: 18 BRPM | HEART RATE: 70 BPM | OXYGEN SATURATION: 99 %

## 2023-06-14 DIAGNOSIS — N93.9 VAGINAL BLEEDING: ICD-10-CM

## 2023-06-15 NOTE — PROGRESS NOTES
Patient had a total hysterectomy 2 weeks ago. She started having vaginal bleeding last night that has progressively worsened since last night. No pain. Patient advised to go to ED. This will be a no charge visit.    Trish Harris P.A.-C.

## 2023-11-17 ENCOUNTER — HOSPITAL ENCOUNTER (OUTPATIENT)
Dept: LAB | Facility: MEDICAL CENTER | Age: 38
End: 2023-11-17
Attending: NURSE PRACTITIONER
Payer: COMMERCIAL

## 2023-11-17 ENCOUNTER — OFFICE VISIT (OUTPATIENT)
Dept: URGENT CARE | Facility: PHYSICIAN GROUP | Age: 38
End: 2023-11-17
Payer: COMMERCIAL

## 2023-11-17 VITALS
SYSTOLIC BLOOD PRESSURE: 136 MMHG | BODY MASS INDEX: 30.06 KG/M2 | HEART RATE: 64 BPM | HEIGHT: 70 IN | WEIGHT: 210 LBS | RESPIRATION RATE: 14 BRPM | DIASTOLIC BLOOD PRESSURE: 90 MMHG | OXYGEN SATURATION: 100 % | TEMPERATURE: 97.8 F

## 2023-11-17 DIAGNOSIS — R53.83 OTHER FATIGUE: ICD-10-CM

## 2023-11-17 DIAGNOSIS — R10.9 FLANK PAIN: ICD-10-CM

## 2023-11-17 DIAGNOSIS — M54.50 LUMBAR BACK PAIN: ICD-10-CM

## 2023-11-17 DIAGNOSIS — E87.1 ACUTE HYPONATREMIA: ICD-10-CM

## 2023-11-17 LAB
ANION GAP SERPL CALC-SCNC: 10 MMOL/L (ref 7–16)
APPEARANCE UR: CLEAR
BILIRUB UR STRIP-MCNC: NEGATIVE MG/DL
BUN SERPL-MCNC: 8 MG/DL (ref 8–22)
CALCIUM SERPL-MCNC: 10 MG/DL (ref 8.5–10.5)
CHLORIDE SERPL-SCNC: 98 MMOL/L (ref 96–112)
CO2 SERPL-SCNC: 25 MMOL/L (ref 20–33)
COLOR UR AUTO: YELLOW
CREAT SERPL-MCNC: 0.96 MG/DL (ref 0.5–1.4)
GFR SERPLBLD CREATININE-BSD FMLA CKD-EPI: 79 ML/MIN/1.73 M 2
GLUCOSE SERPL-MCNC: 86 MG/DL (ref 65–99)
GLUCOSE UR STRIP.AUTO-MCNC: NEGATIVE MG/DL
KETONES UR STRIP.AUTO-MCNC: NEGATIVE MG/DL
LEUKOCYTE ESTERASE UR QL STRIP.AUTO: NEGATIVE
NITRITE UR QL STRIP.AUTO: NEGATIVE
PH UR STRIP.AUTO: 6.5 [PH] (ref 5–8)
POTASSIUM SERPL-SCNC: 4.3 MMOL/L (ref 3.6–5.5)
PROT UR QL STRIP: NEGATIVE MG/DL
RBC UR QL AUTO: NEGATIVE
SODIUM SERPL-SCNC: 133 MMOL/L (ref 135–145)
SP GR UR STRIP.AUTO: 1.01
UROBILINOGEN UR STRIP-MCNC: 0.2 MG/DL

## 2023-11-17 PROCEDURE — 3075F SYST BP GE 130 - 139MM HG: CPT | Performed by: NURSE PRACTITIONER

## 2023-11-17 PROCEDURE — 36415 COLL VENOUS BLD VENIPUNCTURE: CPT

## 2023-11-17 PROCEDURE — 99214 OFFICE O/P EST MOD 30 MIN: CPT | Performed by: NURSE PRACTITIONER

## 2023-11-17 PROCEDURE — 3080F DIAST BP >= 90 MM HG: CPT | Performed by: NURSE PRACTITIONER

## 2023-11-17 PROCEDURE — 80048 BASIC METABOLIC PNL TOTAL CA: CPT

## 2023-11-17 PROCEDURE — 81002 URINALYSIS NONAUTO W/O SCOPE: CPT | Performed by: NURSE PRACTITIONER

## 2023-11-17 ASSESSMENT — ENCOUNTER SYMPTOMS
FEVER: 0
PERIANAL NUMBNESS: 0
FLANK PAIN: 0
LEG PAIN: 0
BACK PAIN: 1
NUMBNESS: 0
PARESTHESIAS: 0
ABDOMINAL PAIN: 0
BOWEL INCONTINENCE: 0
PARESIS: 0

## 2023-11-17 NOTE — PROGRESS NOTES
"  Subjective:     Ninfa Simpson is a 38 y.o. female who presents for Back Pain (X2weeks Lower back pain, kidney area, lethargic)      Presents with bilateral flank pain, feels \"sore\". States she has concerns for her kidneys. Denies a back injury. Hx of sacral arthritis. Pain is increased with ROM. No muscle spasms. Tight pressure in lower abdomen with urination. Had a hysterectomy a few months ago. Has been fatigued x 2.5 weeks. No difficulty with urination.  No new medication changes. Has recently  transitioned from a full time job that required mostly standing, to going to school and being at home more. Has a PCP appointment on 11/28.    Hx or renal lab abnormality several months ago while on lithium, stating it corrected its self once discontinuing the medication.       Back Pain  This is a new problem. The current episode started 1 to 4 weeks ago. The pain is present in the lumbar spine. Pertinent negatives include no abdominal pain, bladder incontinence, bowel incontinence, dysuria, fever, leg pain, numbness, paresis, paresthesias or perianal numbness. Treatments tried: tylenol and OTC pain patch.       Past Medical History:   Diagnosis Date    Pneumonia     Psychiatric problem     depression,anxiety and borderline personality disorder       Past Surgical History:   Procedure Laterality Date    FASCIOTOMY Right 5/19/2022    Procedure: FASCIOTOMY - FOR RELEASE OF PLANTAR FASCIA;  Surgeon: NATALYA Arenas.P.M.;  Location: SURGERY SAME [facility];  Service: Podiatry    EXOSTOSIS EXCISION Right 5/19/2022    Procedure: EXCISION, EXOSTOSIS - BONE SPUR REMOVAL;  Surgeon: NATALYA Arenas.P.M.;  Location: SURGERY [facility];  Service: Podiatry    LIPOSUCTION Bilateral 2/9/2017    Procedure: LIPOSUCTION - ABDOMEN, FLANKS, INNER AND OUTER THIGHS;  Surgeon: Jose Ramon Camarillo M.D.;  Location: SURGERY [facility];  Service:     TONSILLECTOMY  2016    CYST EXCISION  2016    on leg    LESION EXCISION " "GENERAL  2002    BIRTHMARK REMOVED    MYRINGOTOMY Bilateral 1992       Social History     Socioeconomic History    Marital status:      Spouse name: Not on file    Number of children: Not on file    Years of education: Not on file    Highest education level: Not on file   Occupational History    Not on file   Tobacco Use    Smoking status: Every Day     Types: Cigarettes    Smokeless tobacco: Former   Vaping Use    Vaping Use: Every day    Substances: Nicotine   Substance and Sexual Activity    Alcohol use: Not Currently     Comment: quit 2 years ago    Drug use: Yes     Types: Marijuana    Sexual activity: Not on file   Other Topics Concern    Not on file   Social History Narrative    Not on file     Social Determinants of Health     Financial Resource Strain: Not on file   Food Insecurity: Not on file   Transportation Needs: Not on file   Physical Activity: Not on file   Stress: Not on file   Social Connections: Not on file   Intimate Partner Violence: Not on file   Housing Stability: Not on file        History reviewed. No pertinent family history.     Allergies   Allergen Reactions    Cephalexin      Hives, rash, itching and swelling    Clindamycin        Review of Systems   Constitutional:  Negative for fever.   Gastrointestinal:  Negative for abdominal pain and bowel incontinence.   Genitourinary:  Negative for bladder incontinence, dysuria, flank pain, frequency, hematuria and urgency.   Musculoskeletal:  Positive for back pain.   Skin:  Negative for rash.   Neurological:  Negative for numbness and paresthesias.   All other systems reviewed and are negative.       Objective:   BP (!) 136/90   Pulse 64   Temp 36.6 °C (97.8 °F) (Temporal)   Resp 14   Ht 1.778 m (5' 10\")   Wt 95.3 kg (210 lb)   SpO2 100%   BMI 30.13 kg/m²     Physical Exam  Vitals reviewed.   Constitutional:       Appearance: She is not toxic-appearing.   Cardiovascular:      Rate and Rhythm: Normal rate.   Pulmonary:      Effort: " Pulmonary effort is normal. No respiratory distress.   Abdominal:      General: There is no distension.      Palpations: Abdomen is soft.      Tenderness: There is no abdominal tenderness. There is no guarding.   Musculoskeletal:         General: Tenderness present. No swelling or deformity.      Lumbar back: Tenderness present. No swelling, edema, signs of trauma or bony tenderness.      Comments: Bilateral paraspinal lumbar tenderness to palpation, greater on the right. No rash or bruising in the area.    Skin:     General: Skin is warm and dry.      Findings: No bruising, erythema or rash.   Neurological:      Mental Status: She is alert and oriented to person, place, and time.         Assessment/Plan:   1. Lumbar back pain    2. Flank pain  - POCT Urinalysis  - Basic Metabolic Panel; Future    3. Other fatigue  - Basic Metabolic Panel; Future    4. Acute hyponatremia  - Basic Metabolic Panel; Future    Results for orders placed or performed in visit on 11/17/23   POCT Urinalysis   Result Value Ref Range    POC Color YELLOW Negative    POC Appearance CLEAR Negative    POC Glucose NEGATIVE Negative mg/dL    POC Bilirubin NEGATIVE Negative mg/dL    POC Ketones NEGATIVE Negative mg/dL    POC Specific Gravity 1.010 <1.005 - >1.030    POC Blood NEGATIVE Negative    POC Urine PH 6.5 5.0 - 8.0    POC Protein NEGATIVE Negative mg/dL    POC Urobiligen 0.2 Negative (0.2) mg/dL    POC Nitrites NEGATIVE Negative    POC Leukocyte Esterase NEGATIVE Negative   -Can take OTC Ibuprofen and Tylenol as directed for pain.   -Temperature Therapy: Heat or ice, whichever feels better.  -Gentle ROM back stretches and exercises.   -Resume activity as tolerated.    Follow up with your primary care doctor. Follow up for persistent, new, or worsening pain. Emergently for weakness, loss of bowel or bladder, groin pain or numbness, fevers.    -Presents for acute bilateral flank pain x 2.5 weeks. Pain reproducible with ROM and palpation.  Negative UA. Discussed musculoskeletal correlation. Symptomatic care. No saddle anesthesia, leg weakness, or sensory changes. Pt to follow up on BMP with hx of abnormal renal labs secondary to medication, and flank pain.     Mild hyponatremia on labs. Normal renal. Has had fatigue. Intermittent headache, not persistent. Also reports a hx of diarrhea. No confusion, gait or movement disturbances, tremor, nausea, vomiting, shortness of breath. Advised ED f/u for likely repeat labs.     Differential diagnosis, natural history, supportive care, and indications for immediate follow-up discussed.

## 2023-11-17 NOTE — PATIENT INSTRUCTIONS
-Can take OTC Ibuprofen and Tylenol as directed for pain.   -Temperature Therapy: Heat or ice, whichever feels better.  -Gentle ROM back stretches and exercises.   -Resume activity as tolerated.    Follow up with your primary care doctor. Follow up for persistent, new, or worsening pain. Emergently for weakness, loss of bowel or bladder, groin pain or numbness, fevers.   No

## 2023-11-18 ENCOUNTER — TELEPHONE (OUTPATIENT)
Dept: URGENT CARE | Facility: CLINIC | Age: 38
End: 2023-11-18
Payer: COMMERCIAL

## 2023-12-18 ENCOUNTER — OFFICE VISIT (OUTPATIENT)
Dept: URGENT CARE | Facility: PHYSICIAN GROUP | Age: 38
End: 2023-12-18
Payer: COMMERCIAL

## 2023-12-18 VITALS
SYSTOLIC BLOOD PRESSURE: 118 MMHG | TEMPERATURE: 99.2 F | BODY MASS INDEX: 28.92 KG/M2 | DIASTOLIC BLOOD PRESSURE: 72 MMHG | RESPIRATION RATE: 12 BRPM | OXYGEN SATURATION: 98 % | HEART RATE: 94 BPM | HEIGHT: 70 IN | WEIGHT: 202 LBS

## 2023-12-18 DIAGNOSIS — J01.40 ACUTE NON-RECURRENT PANSINUSITIS: ICD-10-CM

## 2023-12-18 PROCEDURE — 3078F DIAST BP <80 MM HG: CPT | Performed by: PHYSICIAN ASSISTANT

## 2023-12-18 PROCEDURE — 3074F SYST BP LT 130 MM HG: CPT | Performed by: PHYSICIAN ASSISTANT

## 2023-12-18 PROCEDURE — 99213 OFFICE O/P EST LOW 20 MIN: CPT | Performed by: PHYSICIAN ASSISTANT

## 2023-12-18 RX ORDER — AMOXICILLIN AND CLAVULANATE POTASSIUM 875; 125 MG/1; MG/1
1 TABLET, FILM COATED ORAL 2 TIMES DAILY
Qty: 14 TABLET | Refills: 0 | Status: SHIPPED | OUTPATIENT
Start: 2023-12-18 | End: 2023-12-25

## 2023-12-18 ASSESSMENT — ENCOUNTER SYMPTOMS
SINUS PAIN: 1
FEVER: 0
CHILLS: 0

## 2023-12-19 NOTE — PROGRESS NOTES
Subjective:   Ninfa Simpson is a 38 y.o. female who presents today with   Chief Complaint   Patient presents with    Sinus Problem     X2weeks, yellow and foul smell mucus      Sinus Problem  This is a new problem. The current episode started 1 to 4 weeks ago. The problem is unchanged. Associated symptoms include congestion. Pertinent negatives include no chills. Treatments tried: netti pot. The treatment provided mild relief.     Yellowish mucus with significant smell noted by patient.    PMH:  has a past medical history of Pneumonia and Psychiatric problem.  MEDS:   Current Outpatient Medications:     amoxicillin-clavulanate (AUGMENTIN) 875-125 MG Tab, Take 1 Tablet by mouth 2 times a day for 7 days., Disp: 14 Tablet, Rfl: 0    VYVANSE 40 MG Cap, Take 40 mg by mouth every day., Disp: , Rfl:     gabapentin (NEURONTIN) 100 MG Cap, Take 100 mg by mouth 3 times a day as needed., Disp: , Rfl:     lurasidone (LATUDA) 20 MG Tab, Take 20 mg by mouth with dinner., Disp: , Rfl:     busPIRone (BUSPAR) 15 MG tablet, Take 7.5 mg by mouth 2 times a day., Disp: , Rfl:   ALLERGIES:   Allergies   Allergen Reactions    Cephalexin      Hives, rash, itching and swelling    Clindamycin      SURGHX:   Past Surgical History:   Procedure Laterality Date    FASCIOTOMY Right 5/19/2022    Procedure: FASCIOTOMY - FOR RELEASE OF PLANTAR FASCIA;  Surgeon: MICHAEL ArenasPMariiMMarii;  Location: SURGERY SAME [facility];  Service: Podiatry    EXOSTOSIS EXCISION Right 5/19/2022    Procedure: EXCISION, EXOSTOSIS - BONE SPUR REMOVAL;  Surgeon: MICHAEL ArenasPMariiM.;  Location: SURGERY SAME [facility];  Service: Podiatry    LIPOSUCTION Bilateral 2/9/2017    Procedure: LIPOSUCTION - ABDOMEN, FLANKS, INNER AND OUTER THIGHS;  Surgeon: Jose Ramon Camarillo M.D.;  Location: SURGERY [facility];  Service:     TONSILLECTOMY  2016    CYST EXCISION  2016    on leg    LESION EXCISION GENERAL  2002    BIRTHMARK REMOVED    MYRINGOTOMY Bilateral 1992  "    SOCHX:  reports that she has quit smoking. Her smoking use included cigarettes. She has quit using smokeless tobacco. She reports that she does not currently use alcohol. She reports current drug use. Drug: Marijuana.  FH: Reviewed with patient, not pertinent to this visit.     Review of Systems   Constitutional:  Negative for chills and fever.   HENT:  Positive for congestion and sinus pain.       Objective:   /72   Pulse 94   Temp 37.3 °C (99.2 °F) (Temporal)   Resp 12   Ht 1.778 m (5' 10\")   Wt 91.6 kg (202 lb)   SpO2 98%   BMI 28.98 kg/m²   Physical Exam  Vitals and nursing note reviewed.   Constitutional:       General: She is not in acute distress.     Appearance: Normal appearance. She is well-developed. She is not ill-appearing or toxic-appearing.   HENT:      Head: Normocephalic and atraumatic.      Right Ear: Hearing, tympanic membrane and ear canal normal.      Left Ear: Hearing, tympanic membrane and ear canal normal.      Nose: Congestion present.      Right Sinus: Maxillary sinus tenderness and frontal sinus tenderness present.      Left Sinus: Maxillary sinus tenderness and frontal sinus tenderness present.      Mouth/Throat:      Mouth: Mucous membranes are moist.      Pharynx: No oropharyngeal exudate or posterior oropharyngeal erythema.   Cardiovascular:      Rate and Rhythm: Normal rate and regular rhythm.      Heart sounds: Normal heart sounds.   Pulmonary:      Effort: Pulmonary effort is normal. No respiratory distress.      Breath sounds: Normal breath sounds. No stridor. No wheezing, rhonchi or rales.   Musculoskeletal:      Comments: Normal movement in all 4 extremities   Skin:     General: Skin is warm and dry.   Neurological:      Mental Status: She is alert.      Coordination: Coordination normal.   Psychiatric:         Mood and Affect: Mood normal.         Assessment/Plan:   Assessment    1. Acute non-recurrent pansinusitis  - amoxicillin-clavulanate (AUGMENTIN) 343-814 " MG Tab; Take 1 Tablet by mouth 2 times a day for 7 days.  Dispense: 14 Tablet; Refill: 0    Symptoms and presentation are consistent with sinus infection which we will treat accordingly with antibiotics.  Recommend continuing with sinus rinse/[place] pot.    Differential diagnosis, natural history, supportive care, and indications for immediate follow-up discussed.   Patient given instructions and understanding of medications and treatment.    If not improving in 3-5 days, F/U with PCP or return to  if symptoms worsen.    Patient agreeable to plan.      Please note that this dictation was created using voice recognition software. I have made every reasonable attempt to correct obvious errors, but I expect that there are errors of grammar and possibly content that I did not discover before finalizing the note.    Jacques Albright PA-C

## 2024-05-23 ENCOUNTER — HOSPITAL ENCOUNTER (OUTPATIENT)
Dept: RADIOLOGY | Facility: MEDICAL CENTER | Age: 39
End: 2024-05-23
Attending: PHYSICIAN ASSISTANT
Payer: COMMERCIAL

## 2024-05-23 DIAGNOSIS — M25.562 LEFT KNEE PAIN, UNSPECIFIED CHRONICITY: ICD-10-CM

## 2024-05-23 DIAGNOSIS — M25.561 RIGHT KNEE PAIN, UNSPECIFIED CHRONICITY: ICD-10-CM

## 2024-05-26 ENCOUNTER — HOSPITAL ENCOUNTER (OUTPATIENT)
Dept: RADIOLOGY | Facility: MEDICAL CENTER | Age: 39
End: 2024-05-26
Attending: FAMILY MEDICINE
Payer: COMMERCIAL

## 2024-05-26 ENCOUNTER — OFFICE VISIT (OUTPATIENT)
Dept: URGENT CARE | Facility: PHYSICIAN GROUP | Age: 39
End: 2024-05-26
Payer: COMMERCIAL

## 2024-05-26 VITALS
WEIGHT: 201.9 LBS | BODY MASS INDEX: 28.9 KG/M2 | TEMPERATURE: 97.1 F | RESPIRATION RATE: 16 BRPM | OXYGEN SATURATION: 100 % | HEART RATE: 72 BPM | DIASTOLIC BLOOD PRESSURE: 72 MMHG | SYSTOLIC BLOOD PRESSURE: 100 MMHG | HEIGHT: 70 IN

## 2024-05-26 DIAGNOSIS — S46.911A STRAIN OF RIGHT SHOULDER, INITIAL ENCOUNTER: ICD-10-CM

## 2024-05-26 DIAGNOSIS — S16.1XXA ACUTE STRAIN OF NECK MUSCLE, INITIAL ENCOUNTER: ICD-10-CM

## 2024-05-26 DIAGNOSIS — S49.91XA INJURY OF RIGHT SHOULDER, INITIAL ENCOUNTER: ICD-10-CM

## 2024-05-26 PROCEDURE — 3074F SYST BP LT 130 MM HG: CPT | Performed by: FAMILY MEDICINE

## 2024-05-26 PROCEDURE — 3078F DIAST BP <80 MM HG: CPT | Performed by: FAMILY MEDICINE

## 2024-05-26 PROCEDURE — 1125F AMNT PAIN NOTED PAIN PRSNT: CPT | Performed by: FAMILY MEDICINE

## 2024-05-26 PROCEDURE — 99213 OFFICE O/P EST LOW 20 MIN: CPT | Performed by: FAMILY MEDICINE

## 2024-05-26 RX ORDER — KETOROLAC TROMETHAMINE 30 MG/ML
15 INJECTION, SOLUTION INTRAMUSCULAR; INTRAVENOUS ONCE
Status: COMPLETED | OUTPATIENT
Start: 2024-05-26 | End: 2024-05-26

## 2024-05-26 RX ORDER — NAPROXEN 500 MG/1
500 TABLET ORAL 2 TIMES DAILY WITH MEALS
Qty: 20 TABLET | Refills: 0 | Status: SHIPPED | OUTPATIENT
Start: 2024-05-26 | End: 2024-06-05

## 2024-05-26 RX ADMIN — KETOROLAC TROMETHAMINE 15 MG: 30 INJECTION, SOLUTION INTRAMUSCULAR; INTRAVENOUS at 09:58

## 2024-05-26 ASSESSMENT — ENCOUNTER SYMPTOMS
NAUSEA: 0
WEIGHT LOSS: 0
MYALGIAS: 0
VOMITING: 0
EYE DISCHARGE: 0
EYE REDNESS: 0

## 2024-05-26 ASSESSMENT — PAIN SCALES - GENERAL: PAINLEVEL: 7=MODERATE-SEVERE PAIN
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
## 2025-06-28 ENCOUNTER — OFFICE VISIT (OUTPATIENT)
Dept: URGENT CARE | Facility: PHYSICIAN GROUP | Age: 40
End: 2025-06-28
Payer: COMMERCIAL

## 2025-06-28 VITALS
TEMPERATURE: 97 F | WEIGHT: 222.6 LBS | BODY MASS INDEX: 31.87 KG/M2 | HEART RATE: 75 BPM | RESPIRATION RATE: 16 BRPM | DIASTOLIC BLOOD PRESSURE: 82 MMHG | OXYGEN SATURATION: 96 % | HEIGHT: 70 IN | SYSTOLIC BLOOD PRESSURE: 114 MMHG

## 2025-06-28 DIAGNOSIS — N30.00 ACUTE CYSTITIS WITHOUT HEMATURIA: Primary | ICD-10-CM

## 2025-06-28 LAB
APPEARANCE UR: NORMAL
BILIRUB UR STRIP-MCNC: NEGATIVE MG/DL
COLOR UR AUTO: YELLOW
GLUCOSE UR STRIP.AUTO-MCNC: NEGATIVE MG/DL
KETONES UR STRIP.AUTO-MCNC: NEGATIVE MG/DL
LEUKOCYTE ESTERASE UR QL STRIP.AUTO: NORMAL
NITRITE UR QL STRIP.AUTO: POSITIVE
PH UR STRIP.AUTO: 6 [PH] (ref 5–8)
POCT INT CON NEG: NEGATIVE
POCT INT CON POS: POSITIVE
POCT URINE PREGNANCY TEST: NEGATIVE
PROT UR QL STRIP: NEGATIVE MG/DL
RBC UR QL AUTO: NEGATIVE
SP GR UR STRIP.AUTO: 1.02
UROBILINOGEN UR STRIP-MCNC: 0.2 MG/DL

## 2025-06-28 RX ORDER — MELOXICAM 15 MG/1
15 TABLET ORAL
COMMUNITY

## 2025-06-28 RX ORDER — NITROFURANTOIN 25; 75 MG/1; MG/1
100 CAPSULE ORAL 2 TIMES DAILY
Qty: 10 CAPSULE | Refills: 0 | Status: SHIPPED | OUTPATIENT
Start: 2025-06-28 | End: 2025-07-03

## 2025-06-28 NOTE — PROGRESS NOTES
"Subjective:     Ninfa Simpson is a 39 y.o. female who presents for UTI (Pain urinating, swelling, urgency x 1 week)    HPI  Pt presents for evaluation of an acute problem  Pt with dysuria the past week   Has urgency and frequency   Has cloudiness for the urine   No fevers   Has some abd cramping   No vaginal discharge     Review of Systems   Genitourinary:  Positive for dysuria, frequency and urgency. Negative for hematuria.       PMH:  has a past medical history of Pneumonia and Psychiatric problem.  MEDS: Current Medications[1]  ALLERGIES: Allergies[2]  SURGHX: Past Surgical History[3]  SOCHX:  reports that she has quit smoking. Her smoking use included cigarettes. She has quit using smokeless tobacco. She reports current alcohol use. She reports current drug use. Drug: Marijuana.     Objective:   /82   Pulse 75   Temp 36.1 °C (97 °F)   Resp 16   Ht 1.778 m (5' 10\")   Wt 101 kg (222 lb 9.6 oz)   SpO2 96%   BMI 31.94 kg/m²     Physical Exam  Constitutional:       General: She is not in acute distress.     Appearance: She is well-developed. She is not diaphoretic.   Pulmonary:      Effort: Pulmonary effort is normal.   Abdominal:      General: Abdomen is flat. There is no distension.      Palpations: Abdomen is soft.      Tenderness: There is no right CVA tenderness, left CVA tenderness, guarding or rebound.      Comments: Tenderness in the suprapubic region   Neurological:      Mental Status: She is alert.         Assessment/Plan:   Assessment    1. Acute cystitis without hematuria  - POCT Urinalysis  - POCT Pregnancy  - nitrofurantoin (MACROBID) 100 MG Cap; Take 1 Capsule by mouth 2 times a day for 5 days.  Dispense: 10 Capsule; Refill: 0    Other orders  - meloxicam (MOBIC) 15 MG tablet; Take 15 mg by mouth every day. FOR 30 DAYS    Patient with acute cystitis.  No sign of pyelonephritis.  Recommend treatment with nitrofurantoin and reviewed other supportive care measures.  Follow-up in the " urgent care as needed.           [1]   Current Outpatient Medications:     meloxicam (MOBIC) 15 MG tablet, Take 15 mg by mouth every day. FOR 30 DAYS, Disp: , Rfl:     nitrofurantoin (MACROBID) 100 MG Cap, Take 1 Capsule by mouth 2 times a day for 5 days., Disp: 10 Capsule, Rfl: 0    VYVANSE 40 MG Cap, Take 40 mg by mouth every day., Disp: , Rfl:     gabapentin (NEURONTIN) 100 MG Cap, Take 100 mg by mouth 3 times a day as needed., Disp: , Rfl:     lurasidone (LATUDA) 20 MG Tab, Take 20 mg by mouth with dinner., Disp: , Rfl:     busPIRone (BUSPAR) 15 MG tablet, Take 7.5 mg by mouth 2 times a day., Disp: , Rfl:   [2]   Allergies  Allergen Reactions    Cephalexin      Hives, rash, itching and swelling    Clindamycin    [3]   Past Surgical History:  Procedure Laterality Date    FASCIOTOMY Right 5/19/2022    Procedure: FASCIOTOMY - FOR RELEASE OF PLANTAR FASCIA;  Surgeon: MICHAEL ArenasP.M.;  Location: SURGERY SAME [facility];  Service: Podiatry    EXOSTOSIS EXCISION Right 5/19/2022    Procedure: EXCISION, EXOSTOSIS - BONE SPUR REMOVAL;  Surgeon: MICHAEL ArenasPMariiM.;  Location: SURGERY SAME [facility];  Service: Podiatry    LIPOSUCTION Bilateral 2/9/2017    Procedure: LIPOSUCTION - ABDOMEN, FLANKS, INNER AND OUTER THIGHS;  Surgeon: Jose Ramon Camarillo M.D.;  Location: SURGERY [facility];  Service:     TONSILLECTOMY  2016    CYST EXCISION  2016    on leg    LESION EXCISION GENERAL  2002    BIRTHMARK REMOVED    MYRINGOTOMY Bilateral 1992

## 2025-08-04 ENCOUNTER — APPOINTMENT (OUTPATIENT)
Dept: URGENT CARE | Facility: PHYSICIAN GROUP | Age: 40
End: 2025-08-04

## (undated) DEVICE — GOWN SURGICAL XX-LARGE - (28EA/CA) SIRUS NON REINFORCED

## (undated) DEVICE — BAG, SPONGE COUNT 50600

## (undated) DEVICE — PROTECTOR ULNA NERVE - (36PR/CA)

## (undated) DEVICE — KIT  I.V. START (100EA/CA)

## (undated) DEVICE — MASK, LARYNGEAL AIRWAY #4

## (undated) DEVICE — PACK LOWER EXTREMITY - (2/CA)

## (undated) DEVICE — SUTURE GENERAL

## (undated) DEVICE — DRAPE LARGE 3 QUARTER - (20/CA)

## (undated) DEVICE — HUMID-VENT HEAT AND MOISTURE EXCHANGE- (50/BX)

## (undated) DEVICE — GLOVESZ 8.5 BIOGEL PI MICRO - PF LF (50PR/BX)

## (undated) DEVICE — GLOVE SZ 6 BIOGEL PI MICRO - PF LF (50PR/BX 4BX/CA)

## (undated) DEVICE — SET EXTENSION WITH 2 PORTS (48EA/CA) ***PART #2C8610 IS A SUBSTITUTE*****

## (undated) DEVICE — CHLORAPREP 26 ML APPLICATOR - ORANGE TINT(25/CA)

## (undated) DEVICE — GLOVE BIOGEL PI INDICATOR SZ 6.0 SURGICAL PF LF -(200PR/CA)

## (undated) DEVICE — SPONGE GAUZESTER. 2X2 4-PL - (2/PK 50PK/BX 30BX/CS)

## (undated) DEVICE — ELECTRODE 850 FOAM ADHESIVE - HYDROGEL RADIOTRNSPRNT (50/PK)

## (undated) DEVICE — SUTURE 5-0 PROL PS-3 (12PK/BX)

## (undated) DEVICE — PACK MINOR BASIN - (2EA/CA)

## (undated) DEVICE — ELECTRODE DUAL RETURN W/ CORD - (50/PK)

## (undated) DEVICE — CATHETER IV 20 GA X 1-1/4 ---SURG.& SDS ONLY--- (50EA/BX)

## (undated) DEVICE — NEEDLE NON SAFETY 27GA X 1-1/4 IN HYPO (100EA/BX)

## (undated) DEVICE — KIT ANESTHESIA W/CIRCUIT & 3/LT BAG W/FILTER (20EA/CA)

## (undated) DEVICE — TUBE CONNECTING SUCTION - CLEAR PLASTIC STERILE 72 IN (50EA/CA)

## (undated) DEVICE — LACTATED RINGERS INJ 1000 ML - (14EA/CA 60CA/PF)

## (undated) DEVICE — CANISTER SUCTION 3000ML MECHANICAL FILTER AUTO SHUTOFF MEDI-VAC NONSTERILE LF DISP  (40EA/CA)

## (undated) DEVICE — GLOVE BIOGEL PI ORTHO SZ 8.5 PF LF (40/BX)

## (undated) DEVICE — SENSOR SPO2 NEO LNCS ADHESIVE (20/BX) SEE USER NOTES

## (undated) DEVICE — GOWN WARMING STANDARD FLEX - (30/CA)

## (undated) DEVICE — BLADE SURGICAL #15 - (50/BX 3BX/CA)

## (undated) DEVICE — KIT ROOM DECONTAMINATION

## (undated) DEVICE — HEAD HOLDER JUNIOR/ADULT

## (undated) DEVICE — SODIUM CHL IRRIGATION 0.9% 1000ML (12EA/CA)

## (undated) DEVICE — TUBING TUMESENCE - 10/BX

## (undated) DEVICE — DRESSING 3X3 ADAPTIC GAUZE - (50EA/CT)

## (undated) DEVICE — SUTURE 5-0 VICRYL PLUS FS-2 27 (36PK/BX)"

## (undated) DEVICE — SUCTION INSTRUMENT YANKAUER BULBOUS TIP W/O VENT (50EA/CA)

## (undated) DEVICE — SLEEVE VASO CALF MED - (10PR/CA)

## (undated) DEVICE — SUTURE 5-0 ETHILON P-3 18 (12PK/BX)"

## (undated) DEVICE — PAD PREP 24 X 48 CUFFED - (100/CA)

## (undated) DEVICE — MASK ANESTHESIA ADULT  - (100/CA)

## (undated) DEVICE — SET LEADWIRE 5 LEAD BEDSIDE DISPOSABLE ECG (1SET OF 5/EA)

## (undated) DEVICE — GLOVE BIOGEL SZ 8.5 SURGICAL PF LTX - (50PR/BX 4BX/CA)

## (undated) DEVICE — NEPTUNE 4 PORT MANIFOLD - (20/PK)

## (undated) DEVICE — TOWELS CLOTH SURGICAL - (4/PK 20PK/CA)

## (undated) DEVICE — CANISTER SUCTION RIGID RED 1500CC (40EA/CA)

## (undated) DEVICE — TOWEL STOP TIMEOUT SAFETY FLAG (40EA/CA)

## (undated) DEVICE — SOD. CHL 10CC SYRINGE PREFILL - W/10 CC (30/BX)

## (undated) DEVICE — TRAY SRGPRP PVP IOD WT PRP - (20/CA)

## (undated) DEVICE — SYRINGE 10 ML CONTROL LL (25EA/BX 4BX/CA)

## (undated) DEVICE — WATER IRRIGATION STERILE 1000ML (12EA/CA)

## (undated) DEVICE — NEEDLE SPINAL NON-SAFETY 18GA X 6 (10EA/BX)

## (undated) DEVICE — TUBING PAL ASPIRATION LIPOSUC

## (undated) DEVICE — TUBING CLEARLINK DUO-VENT - C-FLO (48EA/CA)

## (undated) DEVICE — STOCKINET TUBULAR 6IN STERILE - 6 X 48YDS (25/CA)

## (undated) DEVICE — DRESSING TRANSPARENT FILM TEGADERM 2.375 X 2.75"  (100EA/BX)"

## (undated) DEVICE — SUTURE 5-0 PROLENE P-3 - (12/BX)

## (undated) DEVICE — GLOVE, LITE (PAIR)